# Patient Record
Sex: MALE | Race: BLACK OR AFRICAN AMERICAN | NOT HISPANIC OR LATINO | Employment: OTHER | URBAN - METROPOLITAN AREA
[De-identification: names, ages, dates, MRNs, and addresses within clinical notes are randomized per-mention and may not be internally consistent; named-entity substitution may affect disease eponyms.]

---

## 2020-04-22 ENCOUNTER — APPOINTMENT (OUTPATIENT)
Dept: CT IMAGING | Facility: HOSPITAL | Age: 65
End: 2020-04-22

## 2020-04-22 ENCOUNTER — APPOINTMENT (OUTPATIENT)
Dept: GENERAL RADIOLOGY | Facility: HOSPITAL | Age: 65
End: 2020-04-22

## 2020-04-22 ENCOUNTER — HOSPITAL ENCOUNTER (INPATIENT)
Facility: HOSPITAL | Age: 65
LOS: 3 days | Discharge: HOME | End: 2020-04-25
Attending: EMERGENCY MEDICINE | Admitting: INTERNAL MEDICINE

## 2020-04-22 ENCOUNTER — APPOINTMENT (OUTPATIENT)
Dept: ULTRASOUND IMAGING | Facility: HOSPITAL | Age: 65
End: 2020-04-22

## 2020-04-22 ENCOUNTER — APPOINTMENT (OUTPATIENT)
Dept: NEUROLOGY | Facility: HOSPITAL | Age: 65
End: 2020-04-22

## 2020-04-22 ENCOUNTER — APPOINTMENT (OUTPATIENT)
Dept: MRI IMAGING | Facility: HOSPITAL | Age: 65
End: 2020-04-22

## 2020-04-22 DIAGNOSIS — R56.9 NEW ONSET SEIZURE (HCC): Primary | ICD-10-CM

## 2020-04-22 DIAGNOSIS — D72.829 LEUKOCYTOSIS, UNSPECIFIED TYPE: ICD-10-CM

## 2020-04-22 DIAGNOSIS — N39.0 ACUTE UTI: ICD-10-CM

## 2020-04-22 DIAGNOSIS — N18.9 ACUTE KIDNEY INJURY SUPERIMPOSED ON CHRONIC KIDNEY DISEASE (HCC): ICD-10-CM

## 2020-04-22 DIAGNOSIS — I10 UNCONTROLLED HYPERTENSION: ICD-10-CM

## 2020-04-22 DIAGNOSIS — N17.9 ACUTE KIDNEY INJURY SUPERIMPOSED ON CHRONIC KIDNEY DISEASE (HCC): ICD-10-CM

## 2020-04-22 PROBLEM — N28.9 RENAL INSUFFICIENCY: Status: ACTIVE | Noted: 2020-04-22

## 2020-04-22 PROBLEM — N28.89 RENAL MASS: Status: ACTIVE | Noted: 2020-04-22

## 2020-04-22 PROBLEM — R73.9 HYPERGLYCEMIA: Status: ACTIVE | Noted: 2020-04-22

## 2020-04-22 PROBLEM — Z72.0 TOBACCO USE: Status: ACTIVE | Noted: 2020-04-22

## 2020-04-22 PROBLEM — R11.2 NAUSEA & VOMITING: Status: ACTIVE | Noted: 2020-04-22

## 2020-04-22 LAB
ALBUMIN SERPL-MCNC: 4.1 G/DL (ref 3.5–5.2)
ALBUMIN/GLOB SERPL: 1 G/DL
ALP SERPL-CCNC: 71 U/L (ref 39–117)
ALT SERPL W P-5'-P-CCNC: 19 U/L (ref 1–41)
AMPHET+METHAMPHET UR QL: NEGATIVE
ANION GAP SERPL CALCULATED.3IONS-SCNC: 23.1 MMOL/L (ref 5–15)
AST SERPL-CCNC: 31 U/L (ref 1–40)
BACTERIA UR QL AUTO: ABNORMAL /HPF
BARBITURATES UR QL SCN: NEGATIVE
BASOPHILS # BLD AUTO: 0.03 10*3/MM3 (ref 0–0.2)
BASOPHILS NFR BLD AUTO: 0.2 % (ref 0–1.5)
BENZODIAZ UR QL SCN: NEGATIVE
BILIRUB SERPL-MCNC: 1.1 MG/DL (ref 0.2–1.2)
BILIRUB UR QL STRIP: NEGATIVE
BUN BLD-MCNC: 22 MG/DL (ref 8–23)
BUN/CREAT SERPL: 11 (ref 7–25)
CALCIUM SPEC-SCNC: 9.3 MG/DL (ref 8.6–10.5)
CANNABINOIDS SERPL QL: POSITIVE
CHLORIDE SERPL-SCNC: 93 MMOL/L (ref 98–107)
CLARITY UR: ABNORMAL
CO2 SERPL-SCNC: 19.9 MMOL/L (ref 22–29)
COCAINE UR QL: NEGATIVE
COLOR UR: YELLOW
CREAT BLD-MCNC: 2 MG/DL (ref 0.76–1.27)
D-LACTATE SERPL-SCNC: 1.6 MMOL/L (ref 0.5–2)
DEPRECATED RDW RBC AUTO: 47.7 FL (ref 37–54)
EOSINOPHIL # BLD AUTO: 0.01 10*3/MM3 (ref 0–0.4)
EOSINOPHIL NFR BLD AUTO: 0.1 % (ref 0.3–6.2)
ERYTHROCYTE [DISTWIDTH] IN BLOOD BY AUTOMATED COUNT: 15.1 % (ref 12.3–15.4)
ETHANOL BLD-MCNC: <10 MG/DL (ref 0–10)
ETHANOL UR QL: <0.01 %
GFR SERPL CREATININE-BSD FRML MDRD: 41 ML/MIN/1.73
GLOBULIN UR ELPH-MCNC: 4 GM/DL
GLUCOSE BLD-MCNC: 186 MG/DL (ref 65–99)
GLUCOSE BLDC GLUCOMTR-MCNC: 122 MG/DL (ref 70–130)
GLUCOSE BLDC GLUCOMTR-MCNC: 180 MG/DL (ref 70–130)
GLUCOSE UR STRIP-MCNC: ABNORMAL MG/DL
GRAN CASTS URNS QL MICRO: ABNORMAL /LPF
HBA1C MFR BLD: 5.8 % (ref 4.8–5.6)
HCT VFR BLD AUTO: 48 % (ref 37.5–51)
HGB BLD-MCNC: 16.5 G/DL (ref 13–17.7)
HGB UR QL STRIP.AUTO: ABNORMAL
HYALINE CASTS UR QL AUTO: ABNORMAL /LPF
IMM GRANULOCYTES # BLD AUTO: 0.17 10*3/MM3 (ref 0–0.05)
IMM GRANULOCYTES NFR BLD AUTO: 0.9 % (ref 0–0.5)
KETONES UR QL STRIP: ABNORMAL
LEUKOCYTE ESTERASE UR QL STRIP.AUTO: NEGATIVE
LYMPHOCYTES # BLD AUTO: 1.1 10*3/MM3 (ref 0.7–3.1)
LYMPHOCYTES NFR BLD AUTO: 6 % (ref 19.6–45.3)
MAGNESIUM SERPL-MCNC: 3 MG/DL (ref 1.6–2.4)
MCH RBC QN AUTO: 29.7 PG (ref 26.6–33)
MCHC RBC AUTO-ENTMCNC: 34.4 G/DL (ref 31.5–35.7)
MCV RBC AUTO: 86.5 FL (ref 79–97)
METHADONE UR QL SCN: NEGATIVE
MONOCYTES # BLD AUTO: 1.53 10*3/MM3 (ref 0.1–0.9)
MONOCYTES NFR BLD AUTO: 8.3 % (ref 5–12)
NEUTROPHILS # BLD AUTO: 15.54 10*3/MM3 (ref 1.7–7)
NEUTROPHILS NFR BLD AUTO: 84.5 % (ref 42.7–76)
NITRITE UR QL STRIP: NEGATIVE
NRBC BLD AUTO-RTO: 0 /100 WBC (ref 0–0.2)
OPIATES UR QL: NEGATIVE
OXYCODONE UR QL SCN: NEGATIVE
PH UR STRIP.AUTO: 5.5 [PH] (ref 5–8)
PLATELET # BLD AUTO: 176 10*3/MM3 (ref 140–450)
PMV BLD AUTO: 11.9 FL (ref 6–12)
POTASSIUM BLD-SCNC: 3.9 MMOL/L (ref 3.5–5.2)
PROCALCITONIN SERPL-MCNC: 0.31 NG/ML (ref 0.1–0.25)
PROT SERPL-MCNC: 8.1 G/DL (ref 6–8.5)
PROT UR QL STRIP: ABNORMAL
RBC # BLD AUTO: 5.55 10*6/MM3 (ref 4.14–5.8)
RBC # UR: ABNORMAL /HPF
REF LAB TEST METHOD: ABNORMAL
SODIUM BLD-SCNC: 136 MMOL/L (ref 136–145)
SP GR UR STRIP: >=1.03 (ref 1–1.03)
SPERM URNS QL MICRO: ABNORMAL /HPF
SQUAMOUS #/AREA URNS HPF: ABNORMAL /HPF
TROPONIN T SERPL-MCNC: 0.03 NG/ML (ref 0–0.03)
UROBILINOGEN UR QL STRIP: ABNORMAL
WBC NRBC COR # BLD: 18.38 10*3/MM3 (ref 3.4–10.8)
WBC UR QL AUTO: ABNORMAL /HPF

## 2020-04-22 PROCEDURE — 80307 DRUG TEST PRSMV CHEM ANLYZR: CPT | Performed by: PHYSICIAN ASSISTANT

## 2020-04-22 PROCEDURE — 87040 BLOOD CULTURE FOR BACTERIA: CPT | Performed by: PHYSICIAN ASSISTANT

## 2020-04-22 PROCEDURE — 99254 IP/OBS CNSLTJ NEW/EST MOD 60: CPT | Performed by: PSYCHIATRY & NEUROLOGY

## 2020-04-22 PROCEDURE — 95816 EEG AWAKE AND DROWSY: CPT

## 2020-04-22 PROCEDURE — 87147 CULTURE TYPE IMMUNOLOGIC: CPT | Performed by: PHYSICIAN ASSISTANT

## 2020-04-22 PROCEDURE — 81001 URINALYSIS AUTO W/SCOPE: CPT | Performed by: PHYSICIAN ASSISTANT

## 2020-04-22 PROCEDURE — 82962 GLUCOSE BLOOD TEST: CPT

## 2020-04-22 PROCEDURE — 95819 EEG AWAKE AND ASLEEP: CPT | Performed by: PSYCHIATRY & NEUROLOGY

## 2020-04-22 PROCEDURE — 99285 EMERGENCY DEPT VISIT HI MDM: CPT

## 2020-04-22 PROCEDURE — 85025 COMPLETE CBC W/AUTO DIFF WBC: CPT | Performed by: PHYSICIAN ASSISTANT

## 2020-04-22 PROCEDURE — 83036 HEMOGLOBIN GLYCOSYLATED A1C: CPT | Performed by: NURSE PRACTITIONER

## 2020-04-22 PROCEDURE — 87150 DNA/RNA AMPLIFIED PROBE: CPT | Performed by: PHYSICIAN ASSISTANT

## 2020-04-22 PROCEDURE — 93010 ELECTROCARDIOGRAM REPORT: CPT | Performed by: INTERNAL MEDICINE

## 2020-04-22 PROCEDURE — 25010000002 HYDRALAZINE PER 20 MG: Performed by: NURSE PRACTITIONER

## 2020-04-22 PROCEDURE — 93005 ELECTROCARDIOGRAM TRACING: CPT | Performed by: NURSE PRACTITIONER

## 2020-04-22 PROCEDURE — 83605 ASSAY OF LACTIC ACID: CPT | Performed by: PHYSICIAN ASSISTANT

## 2020-04-22 PROCEDURE — 71045 X-RAY EXAM CHEST 1 VIEW: CPT

## 2020-04-22 PROCEDURE — 83735 ASSAY OF MAGNESIUM: CPT | Performed by: PHYSICIAN ASSISTANT

## 2020-04-22 PROCEDURE — 25010000002 ONDANSETRON PER 1 MG: Performed by: NURSE PRACTITIONER

## 2020-04-22 PROCEDURE — 84145 PROCALCITONIN (PCT): CPT | Performed by: NURSE PRACTITIONER

## 2020-04-22 PROCEDURE — 25010000002 CEFTRIAXONE PER 250 MG: Performed by: PHYSICIAN ASSISTANT

## 2020-04-22 PROCEDURE — 74018 RADEX ABDOMEN 1 VIEW: CPT

## 2020-04-22 PROCEDURE — 76775 US EXAM ABDO BACK WALL LIM: CPT

## 2020-04-22 PROCEDURE — 80053 COMPREHEN METABOLIC PANEL: CPT | Performed by: PHYSICIAN ASSISTANT

## 2020-04-22 PROCEDURE — 70551 MRI BRAIN STEM W/O DYE: CPT

## 2020-04-22 PROCEDURE — 70450 CT HEAD/BRAIN W/O DYE: CPT

## 2020-04-22 PROCEDURE — 84484 ASSAY OF TROPONIN QUANT: CPT | Performed by: NURSE PRACTITIONER

## 2020-04-22 RX ORDER — SPIRONOLACTONE 25 MG/1
25 TABLET ORAL DAILY
COMMUNITY
Start: 2020-01-22 | End: 2020-04-25 | Stop reason: HOSPADM

## 2020-04-22 RX ORDER — DOCUSATE SODIUM 100 MG/1
100 CAPSULE, LIQUID FILLED ORAL 2 TIMES DAILY PRN
Status: DISCONTINUED | OUTPATIENT
Start: 2020-04-22 | End: 2020-04-25 | Stop reason: HOSPADM

## 2020-04-22 RX ORDER — OLMESARTAN MEDOXOMIL, AMLODIPINE AND HYDROCHLOROTHIAZIDE TABLET 40/10/25 MG 40; 10; 25 MG/1; MG/1; MG/1
1 TABLET ORAL DAILY
COMMUNITY
Start: 2020-01-22 | End: 2020-04-25 | Stop reason: HOSPADM

## 2020-04-22 RX ORDER — OMEPRAZOLE 20 MG/1
20 CAPSULE, DELAYED RELEASE ORAL DAILY
COMMUNITY
Start: 2020-04-20

## 2020-04-22 RX ORDER — ONDANSETRON 2 MG/ML
4 INJECTION INTRAMUSCULAR; INTRAVENOUS EVERY 6 HOURS PRN
Status: DISCONTINUED | OUTPATIENT
Start: 2020-04-22 | End: 2020-04-25 | Stop reason: HOSPADM

## 2020-04-22 RX ORDER — LABETALOL HYDROCHLORIDE 5 MG/ML
20 INJECTION, SOLUTION INTRAVENOUS ONCE
Status: COMPLETED | OUTPATIENT
Start: 2020-04-22 | End: 2020-04-22

## 2020-04-22 RX ORDER — AMLODIPINE BESYLATE 5 MG/1
10 TABLET ORAL ONCE
Status: COMPLETED | OUTPATIENT
Start: 2020-04-22 | End: 2020-04-22

## 2020-04-22 RX ORDER — NICOTINE POLACRILEX 4 MG
15 LOZENGE BUCCAL
Status: DISCONTINUED | OUTPATIENT
Start: 2020-04-22 | End: 2020-04-25 | Stop reason: HOSPADM

## 2020-04-22 RX ORDER — AMLODIPINE BESYLATE 10 MG/1
10 TABLET ORAL
Status: DISCONTINUED | OUTPATIENT
Start: 2020-04-23 | End: 2020-04-25 | Stop reason: HOSPADM

## 2020-04-22 RX ORDER — PANTOPRAZOLE SODIUM 40 MG/1
40 TABLET, DELAYED RELEASE ORAL EVERY MORNING
Status: DISCONTINUED | OUTPATIENT
Start: 2020-04-23 | End: 2020-04-25 | Stop reason: HOSPADM

## 2020-04-22 RX ORDER — ACETAMINOPHEN 325 MG/1
650 TABLET ORAL EVERY 4 HOURS PRN
Status: DISCONTINUED | OUTPATIENT
Start: 2020-04-22 | End: 2020-04-25 | Stop reason: HOSPADM

## 2020-04-22 RX ORDER — DICYCLOMINE HCL 20 MG
20 TABLET ORAL
COMMUNITY
Start: 2020-04-20

## 2020-04-22 RX ORDER — METOPROLOL SUCCINATE 100 MG/1
100 TABLET, EXTENDED RELEASE ORAL
Status: DISCONTINUED | OUTPATIENT
Start: 2020-04-22 | End: 2020-04-22

## 2020-04-22 RX ORDER — DEXTROSE MONOHYDRATE 25 G/50ML
25 INJECTION, SOLUTION INTRAVENOUS
Status: DISCONTINUED | OUTPATIENT
Start: 2020-04-22 | End: 2020-04-25 | Stop reason: HOSPADM

## 2020-04-22 RX ORDER — CLONIDINE HYDROCHLORIDE 0.1 MG/1
0.1 TABLET ORAL EVERY 4 HOURS PRN
Status: DISCONTINUED | OUTPATIENT
Start: 2020-04-22 | End: 2020-04-25 | Stop reason: HOSPADM

## 2020-04-22 RX ORDER — ONDANSETRON 4 MG/1
4 TABLET, ORALLY DISINTEGRATING ORAL
COMMUNITY
Start: 2020-04-20

## 2020-04-22 RX ORDER — PROMETHAZINE HYDROCHLORIDE 25 MG/1
25 SUPPOSITORY RECTAL
COMMUNITY
Start: 2020-04-20 | End: 2020-04-25 | Stop reason: HOSPADM

## 2020-04-22 RX ORDER — METOPROLOL SUCCINATE 100 MG/1
100 TABLET, EXTENDED RELEASE ORAL
Status: DISCONTINUED | OUTPATIENT
Start: 2020-04-23 | End: 2020-04-23

## 2020-04-22 RX ORDER — SODIUM CHLORIDE 0.9 % (FLUSH) 0.9 %
10 SYRINGE (ML) INJECTION EVERY 12 HOURS SCHEDULED
Status: DISCONTINUED | OUTPATIENT
Start: 2020-04-22 | End: 2020-04-25 | Stop reason: HOSPADM

## 2020-04-22 RX ORDER — SPIRONOLACTONE 25 MG/1
25 TABLET ORAL DAILY
Status: DISCONTINUED | OUTPATIENT
Start: 2020-04-22 | End: 2020-04-22

## 2020-04-22 RX ORDER — SODIUM CHLORIDE 0.9 % (FLUSH) 0.9 %
10 SYRINGE (ML) INJECTION AS NEEDED
Status: DISCONTINUED | OUTPATIENT
Start: 2020-04-22 | End: 2020-04-25 | Stop reason: HOSPADM

## 2020-04-22 RX ORDER — NICOTINE 21 MG/24HR
1 PATCH, TRANSDERMAL 24 HOURS TRANSDERMAL
Status: DISCONTINUED | OUTPATIENT
Start: 2020-04-22 | End: 2020-04-25 | Stop reason: HOSPADM

## 2020-04-22 RX ORDER — CEFTRIAXONE SODIUM 1 G/50ML
1 INJECTION, SOLUTION INTRAVENOUS EVERY 24 HOURS
Status: COMPLETED | OUTPATIENT
Start: 2020-04-23 | End: 2020-04-24

## 2020-04-22 RX ORDER — HYDRALAZINE HYDROCHLORIDE 20 MG/ML
10 INJECTION INTRAMUSCULAR; INTRAVENOUS EVERY 6 HOURS PRN
Status: DISCONTINUED | OUTPATIENT
Start: 2020-04-22 | End: 2020-04-25 | Stop reason: HOSPADM

## 2020-04-22 RX ORDER — CEFTRIAXONE SODIUM 1 G/50ML
1 INJECTION, SOLUTION INTRAVENOUS ONCE
Status: COMPLETED | OUTPATIENT
Start: 2020-04-22 | End: 2020-04-22

## 2020-04-22 RX ORDER — METOPROLOL SUCCINATE 100 MG/1
100 TABLET, EXTENDED RELEASE ORAL ONCE
Status: COMPLETED | OUTPATIENT
Start: 2020-04-22 | End: 2020-04-22

## 2020-04-22 RX ADMIN — HYDRALAZINE HYDROCHLORIDE 10 MG: 20 INJECTION INTRAMUSCULAR; INTRAVENOUS at 17:22

## 2020-04-22 RX ADMIN — AMLODIPINE BESYLATE 10 MG: 5 TABLET ORAL at 08:42

## 2020-04-22 RX ADMIN — CEFTRIAXONE SODIUM 1 G: 1 INJECTION, SOLUTION INTRAVENOUS at 09:43

## 2020-04-22 RX ADMIN — SODIUM CHLORIDE, PRESERVATIVE FREE 10 ML: 5 INJECTION INTRAVENOUS at 20:56

## 2020-04-22 RX ADMIN — METOPROLOL SUCCINATE 100 MG: 100 TABLET, EXTENDED RELEASE ORAL at 08:54

## 2020-04-22 RX ADMIN — LABETALOL 20 MG/4 ML (5 MG/ML) INTRAVENOUS SYRINGE 20 MG: at 07:39

## 2020-04-22 RX ADMIN — NICOTINE 1 PATCH: 21 PATCH, EXTENDED RELEASE TRANSDERMAL at 18:31

## 2020-04-22 RX ADMIN — LABETALOL 20 MG/4 ML (5 MG/ML) INTRAVENOUS SYRINGE 20 MG: at 08:41

## 2020-04-22 RX ADMIN — ONDANSETRON 4 MG: 2 INJECTION INTRAMUSCULAR; INTRAVENOUS at 21:46

## 2020-04-22 NOTE — ED NOTES
Pt to ED from home with c/o new onset sz witnessed by spouse this morning. Pt also c/o lower abdominal pain that has been going on for several days. Pt states he was seen for belly pain at [place] recently and discharged home. Pt states nausea and vomiting with belly pain x 3 days. Pt hypertensive per EMS. Pt alert at triage, slightly lethargic but able to state name and SS#. Mask placed on pt at triage.     Emy Evans RN  04/22/20 0627

## 2020-04-22 NOTE — ED NOTES
Pt presents to ED via EMS w/c seizure/abdominal pain.  PT states he has no recollection of events of the evening, wife stated to EMS seizure like activity.  Pt complains of hiccups x3 days  Pt states generalized lower abdominal discomfort, nausea.  Pt denies nay recent illness, fever, cough or chills.  Pt A&Ox4     Nicki Salas, RN  04/22/20 0653

## 2020-04-22 NOTE — ED PROVIDER NOTES
07:21  Patient seen and examined with physician assistant.  Briefly patient is 64-year-old male who was seen at another hospital for abdominal pain.  His CT scan was negative and white blood cell count was normal.  States his belly pain has been getting better.  Then last night/this morning patient has tonic-clonic episode with post ictal.  He has no recollection of this.  Denies tongue biting or loss of bowel or bladder continence.    On exam patient is alert and oriented.  No tongue injury.  Patient's abdomen is soft and nontender.  Moves all extremities with normal strength and normal sensation.      Plan is evaluation for seizure and probable admission      MD ATTESTATION NOTE    The CHRISTINA and I have discussed this patient's history, physical exam, and treatment plan.  I have reviewed the documentation and personally had a face to face interaction with the patient. I affirm the documentation and agree with the treatment and plan.  The attached note describes my personal findings.       Isidro Gloria MD  04/22/20 8841

## 2020-04-22 NOTE — ED PROVIDER NOTES
EMERGENCY DEPARTMENT ENCOUNTER    Room Number:  06/06  Date seen:  4/22/2020  Time seen: 6:58 AM  PCP: No primary care provider on file.  Historian: patient      HPI:  Chief Complaint: seizure    A complete HPI/ROS/PMH/PSH/SH/FH are unobtainable due to: none    Context: Evin Park is a 64 y.o. male who presents to the ED for evaluation of seizure like activity.  He was brought in via EMS.  He denies any history of seizures.  He states he drinks alcohol occasionally but not daily and has not had any in 4 days.  He denies any drug use.  He states he has been out of his blood pressure medications for couple days and is waiting for refills from his PCP. He denies chest pain, abdominal pain, fever, URI symptoms and any known exposure to someone positive for COVID 19.    I called his wife, Gilma.  She states they were asleep and she was awakened by him thrashing around in the bed. She checked on him and he continued the thrashing for 2-3 minutes and then was drooling and then wouldn't respond to her.  He was not incontinent of urine or stool.  She states she does drink alcohol occasionally but not daily or regularly.  He was at the [facility] yesterday for abdominal pain.  States he had abdominal pain most of the day, had nausea and vomiting and took mag citrate and MiraLAX because he felt like he was constipated.    PAST MEDICAL HISTORY  Active Ambulatory Problems     Diagnosis Date Noted   • No Active Ambulatory Problems     Resolved Ambulatory Problems     Diagnosis Date Noted   • No Resolved Ambulatory Problems     Past Medical History:   Diagnosis Date   • Hypertension          PAST SURGICAL HISTORY  History reviewed. No pertinent surgical history.      FAMILY HISTORY  History reviewed. No pertinent family history.      SOCIAL HISTORY  Social History     Tobacco Use   • Smoking status: Current Every Day Smoker   Substance and Sexual Activity   • Alcohol use: Yes         ALLERGIES  Patient has no  known allergies.        REVIEW OF SYSTEMS  Review of Systems   Constitutional: Negative for chills and fever.   HENT: Negative.  Negative for congestion and sore throat.    Eyes: Negative.    Respiratory: Negative for cough and shortness of breath.    Cardiovascular: Negative for chest pain.   Gastrointestinal: Positive for abdominal pain, nausea and vomiting.   Genitourinary: Negative.  Negative for dysuria and hematuria.   Musculoskeletal: Negative.    Skin: Negative.    Neurological: Positive for seizures.   Psychiatric/Behavioral: Negative.             PHYSICAL EXAM  ED Triage Vitals [04/22/20 0627]   Temp Heart Rate Resp BP SpO2   97.7 °F (36.5 °C) 110 16 (!) 190/125 96 %      Temp src Heart Rate Source Patient Position BP Location FiO2 (%)   Tympanic Monitor -- -- --         GENERAL: not distressed  HENT: atraumatic, normocephalic  EYES: no scleral icterus, PERRL, EOMI  CV: regular rhythm, regular rate  RESPIRATORY: normal effort, ctab  ABDOMEN: soft, nontender, nondistended, normal bowel sounds  MUSCULOSKELETAL: no deformity  NEURO: alert, moves all extremities, follows commands, normal strength and sensation, mildly drowsy, oriented x 3  SKIN: warm, dry    Vital signs and nursing notes reviewed.          LAB RESULTS  Recent Results (from the past 24 hour(s))   CBC Auto Differential    Collection Time: 04/22/20  6:48 AM   Result Value Ref Range    WBC 18.38 (H) 3.40 - 10.80 10*3/mm3    RBC 5.55 4.14 - 5.80 10*6/mm3    Hemoglobin 16.5 13.0 - 17.7 g/dL    Hematocrit 48.0 37.5 - 51.0 %    MCV 86.5 79.0 - 97.0 fL    MCH 29.7 26.6 - 33.0 pg    MCHC 34.4 31.5 - 35.7 g/dL    RDW 15.1 12.3 - 15.4 %    RDW-SD 47.7 37.0 - 54.0 fl    MPV 11.9 6.0 - 12.0 fL    Platelets 176 140 - 450 10*3/mm3    Neutrophil % 84.5 (H) 42.7 - 76.0 %    Lymphocyte % 6.0 (L) 19.6 - 45.3 %    Monocyte % 8.3 5.0 - 12.0 %    Eosinophil % 0.1 (L) 0.3 - 6.2 %    Basophil % 0.2 0.0 - 1.5 %    Immature Grans % 0.9 (H) 0.0 - 0.5 %    Neutrophils,  Absolute 15.54 (H) 1.70 - 7.00 10*3/mm3    Lymphocytes, Absolute 1.10 0.70 - 3.10 10*3/mm3    Monocytes, Absolute 1.53 (H) 0.10 - 0.90 10*3/mm3    Eosinophils, Absolute 0.01 0.00 - 0.40 10*3/mm3    Basophils, Absolute 0.03 0.00 - 0.20 10*3/mm3    Immature Grans, Absolute 0.17 (H) 0.00 - 0.05 10*3/mm3    nRBC 0.0 0.0 - 0.2 /100 WBC   POC Glucose Once    Collection Time: 04/22/20  6:50 AM   Result Value Ref Range    Glucose 180 (H) 70 - 130 mg/dL       Ordered the above labs and reviewed the results.        RADIOLOGY  Ct Head Without Contrast    Result Date: 4/22/2020  Narrative: CT HEAD WITHOUT CONTRAST  HISTORY: New onset seizure, hypertension.  COMPARISON: None.  FINDINGS: The study is hampered by patient motion. There is no evidence of intracranial hemorrhage, hydrocephalus or of abnormal extra-axial fluid. Areas of decreased attenuation involving the white matter of the cerebral hemispheres are noted bilaterally suggesting moderate small vessel ischemic disease. A lacunar infarct involving the head of the caudate nucleus on the left is noted. No focal area of decreased attenuation to suggest acute infarction is identified.      Impression: 1. The study is hampered by patient motion. 2. There is no evidence of acute infarction or hemorrhage. 3. Moderate small vessel ischemic disease and a lacunar infarct involving the head of the caudate nucleus on the left is noted. Further evaluation could be performed with a MRI examination of the brain with and without contrast, particularly in a patient with a history of new onset seizure activity.  The above information was called to and discussed with Pastora Joy.    Radiation dose reduction techniques were utilized, including automated exposure control and exposure modulation based on body size.  This report was finalized on 4/22/2020 7:52 AM by Dr. Ernie Zavala M.D.      Xr Chest 1 View    Result Date: 4/22/2020  Narrative: XR CHEST 1 VW-  04/22/2020  HISTORY: New  onset seizures.  The heart size is within normal limits. The chest is rotated minimally to the left. No focal infiltrates are seen. No pneumothorax is seen. Bony structures appear unremarkable.      Impression: No acute process identified.  This report was finalized on 4/22/2020 7:12 AM by Dr. Ezequiel Vasquez M.D.            PROCEDURES  Procedures            MEDICATIONS GIVEN IN ER  Medications   cloNIDine (CATAPRES) tablet 0.1 mg (has no administration in time range)   labetalol (NORMODYNE,TRANDATE) injection 20 mg (20 mg Intravenous Given 4/22/20 0739)   labetalol (NORMODYNE,TRANDATE) injection 20 mg (20 mg Intravenous Given 4/22/20 0841)   metoprolol succinate XL (TOPROL-XL) 24 hr tablet 100 mg (100 mg Oral Given 4/22/20 0854)   amLODIPine (NORVASC) tablet 10 mg (10 mg Oral Given 4/22/20 0842)   cefTRIAXone (ROCEPHIN) IVPB 1 g (1 g Intravenous New Bag 4/22/20 0943)             PROGRESS AND CONSULTS      ED Course as of Apr 22 1258 Wed Apr 22, 2020   0703 CHART REVIEW    ER visit at [facility] on 4/20/2020, yesterday, for sudden onset of epigastric abdominal pain with nausea vomiting and acute kidney injury.  CT of the abdomen and pelvis showed no acute abnormalities and interval diminution in size of a previously evaluated left renal mass.  EKG was noted to be normal sinus rhythm, white blood cell count was 8.8.    [KA]   0704  Reviewed pt's history and workup with Dr. Gloria.  After a bedside evaluation; Dr Gloria agrees with the plan of care      [KA]   0731 CT head shows no acute findings per Dr. Zavala.    [KA]   0830 I rechecked the patient, he is resting comfortably.  Blood pressure remains elevated despite initial dose of labetalol, will repeat and give home meds including Norvasc 10 mg and Toprol- mg.  He continues to deny any abdominal pain currently.  His abdomen remains benign.  A blood cell count is increased to 18,000 from 8000 yesterday and with new onset seizure and  acute kidney injury, admission is indicated.  I discussed this with him and he is agreeable.  Pending urinalysis will consult for admission.    [KA]   7921 I discussed the patient with Dr. Trejo of [facility] who agrees to admit.    [KA]   1007 I discussed the patient with Dr. Baum of neurology who agrees to consult    [KA]      ED Course User Index  [KA] Pastora Joy PA       Patient was placed in face mask in first look. Patient was wearing facemask each time I entered the room and throughout our encounter. I wore protective equipment throughout this patient encounter including a face mask, eye shield and gloves. Hand hygiene was performed before donning protective equipment and after removal when leaving the room.      MEDICAL DECISION MAKING      MDM       By history it does seem that the patient had a seizure with a postictal state.  Additionally his blood pressure is uncontrolled, it appears he has an acute UTI, and acute kidney injury.  Though he has had abdominal pain, he has no abdominal pain here, abdomen remains benign.  White blood cell count is elevated to 18,000, 2 days ago it was only 8 may be related to seizure and UTI.  At this time he requires hospitalization for further evaluation and treatment of his symptoms.  He is agreeable with this plan.    DIAGNOSIS  Final diagnoses:   New onset seizure (CMS/HCC)   Uncontrolled hypertension   Acute UTI   Leukocytosis, unspecified type   Acute kidney injury superimposed on chronic kidney disease (CMS/HCC)         DISPOSITION  Admit        Latest Documented Vital Signs:  As of 06:58  BP- (!) 190/125 HR- 110 Temp- 97.7 °F (36.5 °C) (Tympanic) O2 sat- 96%       Pastora Joy PA  04/22/20 1310

## 2020-04-22 NOTE — CONSULTS
Patient Identification:  NAME:  Evin Park  Age:  64 y.o.   Sex:  male   :  1955   MRN:  [id number]       Chief complaint: He does not have one, reason for consult new onset seizure  History of present illness: This patient is a 64-year-old right-handed black male with past history of hypertension who comes in with new onset generalized seizure duration not known quality tonic-clonic associated symptoms he was postictal context a patient who states he is never had a stroke but by my independent I will review CT does show some chronic atrophy and evidence of lacunar disease.  Quality as described modifying factors we have not given him any seizure medications at this time.  Context patient who does have an acute urinary tract infection acute renal insufficiency and is being treated with antibiotics appropriately for this.  He is never had a seizure before did not bite his tongue and states he is never had a stroke he does not have any new focal paresthesias paralysis.  No headache or neck stiffness and he denies fever or rash he did have some abdominal pain recently      Past medical history:  Past Medical History:   Diagnosis Date   • Hypertension        Past surgical history:  History reviewed. No pertinent surgical history.    Allergies:  Patient has no known allergies.    Home medications:  Medications Prior to Admission   Medication Sig Dispense Refill Last Dose   • dicyclomine (BENTYL) 20 MG tablet Take 20 mg by mouth.   Past Week at Unknown time   • Metoprolol Succinate 100 MG capsule extended-release 24 hour sprinkle Take 1 tablet by mouth Daily.   Past Week at Unknown time   • metoprolol tartrate (LOPRESSOR) 25 MG tablet Take 25 mg by mouth 2 (Two) Times a Day.   Past Week at Unknown time   • Olmesartan-amLODIPine-HCTZ 40-10-25 MG tablet Take 1 tablet by mouth Daily.   Past Week at Unknown time   • omeprazole (priLOSEC) 20 MG capsule Take 20 mg by mouth Daily.   Past Week at Unknown time   •  ondansetron ODT (ZOFRAN-ODT) 4 MG disintegrating tablet Take 4 mg by mouth.   4/21/2020 at Unknown time   • promethazine (PHENERGAN) 25 MG suppository Insert 25 mg into the rectum.   Past Week at Unknown time   • spironolactone (ALDACTONE) 25 MG tablet Take 25 mg by mouth Daily.   Past Week at Unknown time        Hospital medications:        •  cloNIDine    Family history:  History reviewed. No pertinent family history.    Social history:  Social History     Tobacco Use   • Smoking status: Current Every Day Smoker   Substance Use Topics   • Alcohol use: Yes   • Drug use: Not on file       Review of systems:    He states he is not a significant drinker does not drink on a daily basis he does smoke marijuana no other abnormal drugs he has not had headaches hair eyes ears nose throat skin bone joint  lymphatic hematologic oncologic complaints he has had some abdominal pain recently and does appear to have an acute urinary tract infection but he does not think he has had any fever chills or rash he specifically denies any type of tongue biting urinary incontinence with this episode but cannot give me anything else.  He states he is never had a stroke    Objective:  Vitals Ranges:   Temp:  [97.7 °F (36.5 °C)-98.3 °F (36.8 °C)] 98.3 °F (36.8 °C)  Heart Rate:  [] 81  Resp:  [16-18] 18  BP: (174-190)/(110-134) 174/110      Physical Exam:  Awake alert and oriented x3 fund of knowledge poor attention span concentration good recent remote memory fair language function normal well-developed well-nourished in no distress pupils 2 constricting to one 1/2 normal disc retinas visual fields extraocular movements full without nystagmus nasolabial folds palate tongue symmetrical hearing facial sensation head turning shoulder shrug is normal.  Motor some psychomotor retardation but no rigidity atrophy or fasciculations overall strength 5 out of 5 reflexes trace throughout symmetrical toes downgoing bilaterally sensation normal  light touch face both arms and both legs coordination normal in the upper extremity Station and gait was not tested as he just got done having a seizure and I did not want him to pass out or fall heart is regular without murmur neck supple without bruits extremities no clubbing cyanosis edema visual acuity normal at 3 feet    Results review:   I reviewed the patient's new clinical results.    Data review:  Lab Results (last 24 hours)     Procedure Component Value Units Date/Time    Lactic Acid, Plasma [[id number]]  (Normal) Collected:  04/22/20 0918    Specimen:  Blood Updated:  04/22/20 1008     Lactate 1.6 mmol/L     Blood Culture - Blood, Arm, Left [[id number]] Collected:  04/22/20 0939    Specimen:  Blood from Arm, Left Updated:  04/22/20 0944    Blood Culture - Blood, Arm, Right [[id number]] Collected:  04/22/20 0918    Specimen:  Blood from Arm, Right Updated:  04/22/20 0943    Urinalysis With Microscopic If Indicated (No Culture) - Urine, Clean Catch [[id number]]  (Abnormal) Collected:  04/22/20 0821    Specimen:  Urine, Clean Catch Updated:  04/22/20 0854     Color, UA Yellow     Appearance, UA Cloudy     pH, UA 5.5     Specific Gravity, UA >=1.030     Glucose,  mg/dL (Trace)     Ketones, UA Trace     Bilirubin, UA Negative     Blood, UA Large (3+)     Protein, UA >=300 mg/dL (3+)     Leuk Esterase, UA Negative     Nitrite, UA Negative     Urobilinogen, UA 0.2 E.U./dL    Urinalysis, Microscopic Only - Urine, Clean Catch [[id number]]  (Abnormal) Collected:  04/22/20 0821    Specimen:  Urine, Clean Catch Updated:  04/22/20 0854     RBC, UA 6-12 /HPF      WBC, UA 3-5 /HPF      Bacteria, UA 2+ /HPF      Squamous Epithelial Cells, UA 0-2 /HPF      Hyaline Casts, UA 13-20 /LPF      Granular Casts, UA 7-12 /LPF      Sperm, UA Small/1+ /HPF      Methodology Manual Light Microscopy    Urine Drug Screen - Urine, Clean Catch [[id number]]  (Abnormal) Collected:  04/22/20 0821    Specimen:  Urine, Clean Catch Updated:   04/22/20 0848     Amphet/Methamphet, Screen Negative     Barbiturates Screen, Urine Negative     Benzodiazepine Screen, Urine Negative     Cocaine Screen, Urine Negative     Opiate Screen Negative     THC, Screen, Urine Positive     Methadone Screen, Urine Negative     Oxycodone Screen, Urine Negative    Narrative:       Negative Thresholds For Drugs Screened:     Amphetamines               500 ng/ml   Barbiturates               200 ng/ml   Benzodiazepines            100 ng/ml   Cocaine                    300 ng/ml   Methadone                  300 ng/ml   Opiates                    300 ng/ml   Oxycodone                  100 ng/ml   THC                        50 ng/ml    The Normal Value for all drugs tested is negative. This report includes final unconfirmed screening results to be used for medical treatment purposes only. Unconfirmed results must not be used for non-medical purposes such as employment or legal testing. Clinical consideration should be applied to any drug of abuse test, particulary when unconfirmed results are used.    Comprehensive Metabolic Panel [[id number]]  (Abnormal) Collected:  04/22/20 0648    Specimen:  Blood Updated:  04/22/20 0721     Glucose 186 mg/dL      BUN 22 mg/dL      Creatinine 2.00 mg/dL      Sodium 136 mmol/L      Potassium 3.9 mmol/L      Chloride 93 mmol/L      CO2 19.9 mmol/L      Calcium 9.3 mg/dL      Total Protein 8.1 g/dL      Albumin 4.10 g/dL      ALT (SGPT) 19 U/L      AST (SGOT) 31 U/L      Alkaline Phosphatase 71 U/L      Total Bilirubin 1.1 mg/dL      eGFR  African Amer 41 mL/min/1.73      Globulin 4.0 gm/dL      A/G Ratio 1.0 g/dL      BUN/Creatinine Ratio 11.0     Anion Gap 23.1 mmol/L     Narrative:       GFR Normal >60  Chronic Kidney Disease <60  Kidney Failure <15      Magnesium [[id number]]  (Abnormal) Collected:  04/22/20 0648    Specimen:  Blood Updated:  04/22/20 0721     Magnesium 3.0 mg/dL     Ethanol [[id number]] Collected:  04/22/20 0648    Specimen:   Blood Updated:  04/22/20 0721     Ethanol <10 mg/dL      Ethanol % <0.010 %     CBC & Differential [[id number]] Collected:  04/22/20 0648    Specimen:  Blood Updated:  04/22/20 0658    Narrative:       The following orders were created for panel order CBC & Differential.  Procedure                               Abnormality         Status                     ---------                               -----------         ------                     CBC Auto Differential[[id number]]        Abnormal            Final result                 Please view results for these tests on the individual orders.    CBC Auto Differential [[id number]]  (Abnormal) Collected:  04/22/20 0648    Specimen:  Blood Updated:  04/22/20 0658     WBC 18.38 10*3/mm3      RBC 5.55 10*6/mm3      Hemoglobin 16.5 g/dL      Hematocrit 48.0 %      MCV 86.5 fL      MCH 29.7 pg      MCHC 34.4 g/dL      RDW 15.1 %      RDW-SD 47.7 fl      MPV 11.9 fL      Platelets 176 10*3/mm3      Neutrophil % 84.5 %      Lymphocyte % 6.0 %      Monocyte % 8.3 %      Eosinophil % 0.1 %      Basophil % 0.2 %      Immature Grans % 0.9 %      Neutrophils, Absolute 15.54 10*3/mm3      Lymphocytes, Absolute 1.10 10*3/mm3      Monocytes, Absolute 1.53 10*3/mm3      Eosinophils, Absolute 0.01 10*3/mm3      Basophils, Absolute 0.03 10*3/mm3      Immature Grans, Absolute 0.17 10*3/mm3      nRBC 0.0 /100 WBC     POC Glucose Once [[id number]]  (Abnormal) Collected:  04/22/20 0650    Specimen:  Blood Updated:  04/22/20 0651     Glucose 180 mg/dL            Imaging:  Imaging Results (Last 24 Hours)     Procedure Component Value Units Date/Time    CT Head Without Contrast [[id number]] Collected:  04/22/20 0740     Updated:  04/22/20 0755    Narrative:       CT HEAD WITHOUT CONTRAST     HISTORY: New onset seizure, hypertension.     COMPARISON: None.     FINDINGS: The study is hampered by patient motion. There is no evidence  of intracranial hemorrhage, hydrocephalus or of abnormal  extra-axial  fluid. Areas of decreased attenuation involving the white matter of the  cerebral hemispheres are noted bilaterally suggesting moderate small  vessel ischemic disease. A lacunar infarct involving the head of the  caudate nucleus on the left is noted. No focal area of decreased  attenuation to suggest acute infarction is identified.       Impression:       1. The study is hampered by patient motion.   2. There is no evidence of acute infarction or hemorrhage.   3. Moderate small vessel ischemic disease and a lacunar infarct  involving the head of the caudate nucleus on the left is noted. Further  evaluation could be performed with a MRI examination of the brain with  and without contrast, particularly in a patient with a history of new  onset seizure activity.     The above information was called to and discussed with Pastora Joy.           Radiation dose reduction techniques were utilized, including automated  exposure control and exposure modulation based on body size.     This report was finalized on 4/22/2020 7:52 AM by Dr. Ernie Zavala M.D.       XR Chest 1 View [[id number]] Collected:  04/22/20 0710     Updated:  04/22/20 0716    Narrative:       XR CHEST 1 VW-  04/22/2020     HISTORY: New onset seizures.     The heart size is within normal limits. The chest is rotated minimally  to the left. No focal infiltrates are seen. No pneumothorax is seen.  Bony structures appear unremarkable.       Impression:       No acute process identified.     This report was finalized on 4/22/2020 7:12 AM by Dr. Ezequiel Vasquez M.D.                Assessment and Plan:       New onset seizure (CMS/HCC)    In a patient with severe elevation of blood pressure elevated white count and what appears to be an acute urinary tract infection no significant alcohol by report no drugs and I do not see evidence of an acute stroke although by CT independent eyeball review he has had some chronic changes I will check a  plain MRI of the brain as he has some elevated creatinine acute renal failure and will check an EEG at this point I am not ready to place this gentleman on seizure medication for the rest of his life based upon a single generalized convulsion.  Let see what the work-up shows thanks      Wayne Baum MD  04/22/20  12:41

## 2020-04-22 NOTE — H&P
History and Physical    Patient Name: Evin Park  Age/Sex: 64 y.o. male  : 1955  MRN: [id number]    Date of Admission: 2020  Date of Encounter Visit: 20  Encounter Provider: ABILIO Nino  Place of Service: [facility]  Patient Care Team:  System, Provider Not In as PCP - General    Subjective:     Chief Complaint:   Chief Complaint   Patient presents with   • Abdominal Pain   • Seizures       History of Present Illness  Evin Park is a 64 y.o. male with a history of hypertension and lumbar disease who presented with complaints of seizure like activity.  Patient was just seen in the ER at [facility] 2 days ago due to complaints of chest pain, abdominal pain and nausea/ vomiting.  He had a CT of abdomen at that time that showed a left renal mass that was felt to possibly be a benign entity and was discharged home.  At home he was still complaining of abdominal pain, nausea and vomiting and per wife report he took some mag citrate and MiraLAX because he felt like he was constipated and still has not had a BM for a few days.  Wife went to check on him this morning and was noted that he was thrashing in bed for about 2 to 3 minutes and was drooling and difficult to respond.  He had no incontinence of urine and stool and did not seem to bite his tongue.  Denies any chest pain, fever, chills, cough, headache, blurred vision, shortness of breath or recent exposure to sick contact or someone with COVID-19. Denies any prior stroke, CAD, DM, HLD or cancer.      He does drink alcohol occasionally, but not on a regular basis and none within the past 4 days.  He reported that he was out of his blood pressure medications for a couple of days and was awaiting refill from his PCP. He is a 1PPD smoker and also long term marijuana user.  In the ER he was hypertensive, but had no fever or hypoxia.  Labs showed leukocytosis, hyperglycemia, slightly elevated creatinine of 2.0 (baseline of 1.7  on 420), mag 3.0, normal lactate and abnormal UA with greater than 300 protein, RBC, WBC and 2+ bacteria with trace ketones and negative nitrate.  Urine drug screen was positive for THC and alcohol level was negative.  This x-ray showed no acute disease.    CT abdomen at [facility]: 4/20/20  Abdomen: Limited evaluation of the lung bases is unremarkable. The gallbladder is normal. Again seen are small cysts within the liver, unchanged. The spleen, pancreas, adrenal glands, and right kidney are normal. There is a rim calcified round   low-density focus within the inferior aspect of the left kidney. This is smaller than on the prior study. There is no biliary or ureteral dilation.     Pelvis: The urinary bladder is unremarkable. There is diverticulosis of the descending and sigmoid segments of the colon but no sign of an acute inflammatory process. There is no ascites or free intraperitoneal air.    IMPRESSION:   1. No acute abnormalities of the abdomen and pelvis.  2. Interval diminution in size of a previously evaluated left renal mass. This almost certainly indicates that this is a benign entity.    Review of Systems   Constitutional: Negative for chills, fatigue and fever.   HENT: Negative for congestion and trouble swallowing.    Eyes: Negative for visual disturbance.   Respiratory: Negative for cough, shortness of breath and wheezing.    Cardiovascular: Positive for chest pain. Negative for palpitations and leg swelling.   Gastrointestinal: Positive for abdominal pain, constipation, nausea and vomiting. Negative for diarrhea.   Genitourinary: Positive for decreased urine volume. Negative for dysuria.        Pelvic pain   Musculoskeletal: Negative for back pain.   Neurological: Negative for dizziness, syncope, weakness and headaches.   Psychiatric/Behavioral: Negative for confusion. The patient is not nervous/anxious.    All other systems reviewed and are negative.    Past Medical and Surgical History:  Past Medical  History:   Diagnosis Date   • Hypertension    • Lumbar stenosis        History reviewed. No pertinent surgical history.    Home Medications:   No current facility-administered medications on file prior to encounter.      Current Outpatient Medications on File Prior to Encounter   Medication Sig Dispense Refill   • dicyclomine (BENTYL) 20 MG tablet Take 20 mg by mouth.     • Metoprolol Succinate 100 MG capsule extended-release 24 hour sprinkle Take 1 tablet by mouth Daily.     • Olmesartan-amLODIPine-HCTZ 40-10-25 MG tablet Take 1 tablet by mouth Daily.     • omeprazole (priLOSEC) 20 MG capsule Take 20 mg by mouth Daily.     • ondansetron ODT (ZOFRAN-ODT) 4 MG disintegrating tablet Take 4 mg by mouth.     • promethazine (PHENERGAN) 25 MG suppository Insert 25 mg into the rectum.     • spironolactone (ALDACTONE) 25 MG tablet Take 25 mg by mouth Daily.     • [DISCONTINUED] metoprolol tartrate (LOPRESSOR) 25 MG tablet Take 25 mg by mouth 2 (Two) Times a Day.         Inpatient Medications:  Scheduled Meds:    [START ON 4/23/2020] amLODIPine 10 mg Oral Q24H   [START ON 4/23/2020] cefTRIAXone 1 g Intravenous Q24H   insulin lispro 0-7 Units Subcutaneous TID AC   [START ON 4/23/2020] metoprolol succinate  mg Oral Q24H   [START ON 4/23/2020] pantoprazole 40 mg Oral QAM   sodium chloride 10 mL Intravenous Q12H   spironolactone 25 mg Oral Daily     Continuous Infusions:   PRN Meds:.•  acetaminophen  •  cloNIDine  •  dextrose  •  dextrose  •  glucagon (human recombinant)  •  hydrALAZINE  •  ondansetron  •  sodium chloride    Allergies:  No Known Allergies    Past Social History:  Social History     Socioeconomic History   • Marital status:      Spouse name: Not on file   • Number of children: Not on file   • Years of education: Not on file   • Highest education level: Not on file   Tobacco Use   • Smoking status: Current Every Day Smoker     Packs/day: 1.00   Substance and Sexual Activity   • Alcohol use: Yes      Comment: OCCASIONALLY 1-2 MIXED DRINKS   • Drug use: Yes     Types: Marijuana       Past Family History:  Family History   Problem Relation Age of Onset   • Hypertension Mother    • Diabetes Brother        Objective:   Temp:  [97.7 °F (36.5 °C)-98.3 °F (36.8 °C)] 98.3 °F (36.8 °C)  Heart Rate:  [] 81  Resp:  [16-18] 18  BP: (174-190)/(110-134) 174/110   SpO2:  [96 %-98 %] 96 %  on    Device (Oxygen Therapy): room air    Intake/Output Summary (Last 24 hours) at 4/22/2020 1545  Last data filed at 4/22/2020 1300  Gross per 24 hour   Intake 240 ml   Output --   Net 240 ml     Body mass index is 21.24 kg/m².      04/22/20  0640 04/22/20  1049   Weight: 63.5 kg (140 lb) 63.4 kg (139 lb 11.2 oz)     Weight change:     Physical Exam   Constitutional: He is oriented to person, place, and time. He appears well-developed. No distress.   thin   HENT:   Head: Normocephalic and atraumatic.   Eyes: Pupils are equal, round, and reactive to light. Conjunctivae are normal. Scleral icterus (mild and congested) is present.   Neck: Neck supple. No tracheal deviation present.   Cardiovascular: Normal rate, regular rhythm, normal heart sounds and intact distal pulses.   No murmur heard.  Pulmonary/Chest: Effort normal and breath sounds normal. He has no wheezes. He has no rales.   Abdominal: Soft. Bowel sounds are normal. There is tenderness (suprapubic and mild epigastric).   Musculoskeletal: He exhibits no edema.   Neurological: He is alert and oriented to person, place, and time.   Skin: Skin is warm and dry. He is not diaphoretic.   Psychiatric: He has a normal mood and affect. His behavior is normal.   Nursing note and vitals reviewed.       Lab Review:     Results from last 7 days   Lab Units 04/22/20  0648 04/20/20  1809   SODIUM mmol/L 136 140   POTASSIUM mmol/L 3.9 3.5   CHLORIDE mmol/L 93* 109*   TOTAL CO2 mmol/L  --  25   CO2 mmol/L 19.9*  --    BUN mg/dL 22 19   CREATININE mg/dL 2.00* 1.7*   GLUCOSE mg/dL 186*  --     CALCIUM mg/dL 9.3 8.8   AST (SGOT) U/L 31 21   ALT (SGPT) U/L 19 14     Estimated Creatinine Clearance: 34.3 mL/min (A) (by C-G formula based on SCr of 2 mg/dL (H)).  Results from last 7 days   Lab Units 04/22/20  0648   HEMOGLOBIN A1C % 5.80*     Results from last 7 days   Lab Units 04/22/20  0650   GLUCOSE mg/dL 180*     Results from last 7 days   Lab Units 04/20/20  1809   TROPONIN I ng/mL <0.012             Results from last 7 days   Lab Units 04/22/20  0648   MAGNESIUM mg/dL 3.0*         Results from last 7 days   Lab Units 04/22/20  0648 04/20/20  1809   WBC 10*3/mm3 18.38* 8.87   HEMOGLOBIN g/dL 16.5 13.5   HEMATOCRIT % 48.0 42.3   PLATELETS 10*3/mm3 176 180   MCV fL 86.5 90.2   MCH pg 29.7 28.8   MCHC g/dL 34.4 31.9   RDW % 15.1 16.0   RDW-SD fl 47.7  --    MPV fL 11.9 11.3*   NEUTROPHIL % % 84.5* 52.3   LYMPHOCYTE % % 6.0* 37.3   MONOCYTES % % 8.3 7.2   EOSINOPHIL % % 0.1* 2.8   BASOPHIL % % 0.2 0.2   IMM GRAN % % 0.9* 0.2*   NEUTROS ABS 10*3/mm3 15.54* 4.63   LYMPHS ABS 10*3/mm3 1.10 3.31   MONOS ABS 10*3/mm3 1.53* 0.64   EOS ABS 10*3/mm3 0.01 0.25   BASOS ABS 10*3/mm3 0.03 0.02   IMMATURE GRANS (ABS) 10*3/mm3 0.17* 0.02   NRBC /100 WBC 0.0 0           Results from last 7 days   Lab Units 04/22/20  0918 04/22/20  0648   PROCALCITONIN ng/mL  --  0.31*   LACTATE mmol/L 1.6  --          Results from last 7 days   Lab Units 04/20/20  1809   LIPASE U/L 207         Results from last 7 days   Lab Units 04/22/20  0821   NITRITE UA  Negative   WBC UA /HPF 3-5*   BACTERIA UA /HPF 2+*   SQUAM EPITHEL UA /HPF 0-2               Imaging:  Imaging Results (Last 24 Hours)     Procedure Component Value Units Date/Time    CT Head Without Contrast [[id number]] Collected:  04/22/20 0740     Updated:  04/22/20 0755    Narrative:       CT HEAD WITHOUT CONTRAST     HISTORY: New onset seizure, hypertension.     COMPARISON: None.     FINDINGS: The study is hampered by patient motion. There is no evidence  of intracranial  hemorrhage, hydrocephalus or of abnormal extra-axial  fluid. Areas of decreased attenuation involving the white matter of the  cerebral hemispheres are noted bilaterally suggesting moderate small  vessel ischemic disease. A lacunar infarct involving the head of the  caudate nucleus on the left is noted. No focal area of decreased  attenuation to suggest acute infarction is identified.       Impression:       1. The study is hampered by patient motion.   2. There is no evidence of acute infarction or hemorrhage.   3. Moderate small vessel ischemic disease and a lacunar infarct  involving the head of the caudate nucleus on the left is noted. Further  evaluation could be performed with a MRI examination of the brain with  and without contrast, particularly in a patient with a history of new  onset seizure activity.     The above information was called to and discussed with Pastora Joy.           Radiation dose reduction techniques were utilized, including automated  exposure control and exposure modulation based on body size.     This report was finalized on 4/22/2020 7:52 AM by Dr. Ernie Zavala M.D.       XR Chest 1 View [[id number]] Collected:  04/22/20 0710     Updated:  04/22/20 0716    Narrative:       XR CHEST 1 VW-  04/22/2020     HISTORY: New onset seizures.     The heart size is within normal limits. The chest is rotated minimally  to the left. No focal infiltrates are seen. No pneumothorax is seen.  Bony structures appear unremarkable.       Impression:       No acute process identified.     This report was finalized on 4/22/2020 7:12 AM by Dr. Ezequiel Vasquez M.D.             EKG:  ECG 12 Lead    (Results Pending)       I reviewed the patient's new clinical results and medications.  I reviewed the patient's new imaging results and agree with the interpretation.      Problem List:     Active Hospital Problems    Diagnosis  POA   • **New onset seizure (CMS/HCC) [R56.9]  Yes   • Hyperglycemia [R73.9]   Unknown   • Leukocytosis [D72.829]  Unknown   • Acute UTI (urinary tract infection) [N39.0]  Unknown   • Hypertension [I10]  Unknown   • Nausea & vomiting [R11.2]  Unknown   • Renal insufficiency [N28.9]  Unknown   • Renal mass [N28.89]  Unknown      Resolved Hospital Problems   No resolved problems to display.        Assessment and Plan:       New onset seizure (CMS/HCC)  -Prior history and no sign of incontinence or tongue biting, but did have a postictal phase.  -CT head showed moderate small vessel disease and lacunar infarct of left caudate nucleus.   -Appreciate neurology input with plans for MRI and EEG.  Given that this is the first episode of seizure plan to hold on antiepileptics unless other pathogenesis suggest treatment.  -Monitor nightly saturations to eval for any possible nocturnal hypoxia that could contribute  -Neurochecks and seizure precautions      Hyperglycemia  -Likely reactive from recent seizure activity.  -Check A1c  -Monitor glucose before meals at bedtime and treat with sliding scale insulin as needed      Leukocytosis  -Likely reactive from recent seizure activity in acute distress.  Also could be related to UTI.  This x-ray showed no sign of aspirational pneumonia. Afebrile and no hypoxia.   -Monitor CBC, check procal  -given recent ER visit raises concern for possible COVID exposure.       Acute UTI (urinary tract infection)  -abnormal UA suggestive of acute UTI.   -send urine for culture. Monitor blood cultures   -Continue rocephin daily       Hypertension  - BP elevated and recently out of home meds that could have caused some rebound hypertension.   - resume home meds, except HCTZ and aldactone (dt renal function)  -PRN hydralazine  -check trop, lipid panel and EKG given recent chest pain reports     Renal insuffiencey/ renal mass  -creatinine 2.0 up from 1.7 on 4/20 and unclear of baseline. Does have some proteinuria likely from hypertensive disease.   - monitor renal function and  if worsens consider renal consult  - recent Ct report from [facility] noted a renal mass that was likely benign and was small than prior study.  -check renal ultrasound    Abdominal pain/ N/V  -PRN antiemetics  -recent Ct unremarkable and could be related to UTI. Bentyl could have caused some constipation  - stool softeners   -KUB  -continue PPI    Tobacco use  -nicotine patch  -encouraged tobacco cessation    Admit to tele  DVT prophylaxis: SCD's  Code status addressed: full code  Diet: cardiac    I discussed the patients findings and my recommendations with patient.      ABILIO Nino  [facility]  04/22/20  2:09 PM    Dictated utilizing Dragon dictation

## 2020-04-23 ENCOUNTER — APPOINTMENT (OUTPATIENT)
Dept: CARDIOLOGY | Facility: HOSPITAL | Age: 65
End: 2020-04-23

## 2020-04-23 PROBLEM — E87.1 HYPONATREMIA: Status: ACTIVE | Noted: 2020-04-23

## 2020-04-23 LAB
ANION GAP SERPL CALCULATED.3IONS-SCNC: 14.7 MMOL/L (ref 5–15)
ANION GAP SERPL CALCULATED.3IONS-SCNC: 16.6 MMOL/L (ref 5–15)
AORTIC DIMENSIONLESS INDEX: 0.6 (DI)
BACTERIA BLD CULT: ABNORMAL
BASOPHILS # BLD AUTO: 0.02 10*3/MM3 (ref 0–0.2)
BASOPHILS NFR BLD AUTO: 0.1 % (ref 0–1.5)
BH CV ECHO MEAS - AO MAX PG (FULL): 7 MMHG
BH CV ECHO MEAS - AO MAX PG: 9.5 MMHG
BH CV ECHO MEAS - AO MEAN PG (FULL): 3 MMHG
BH CV ECHO MEAS - AO MEAN PG: 5 MMHG
BH CV ECHO MEAS - AO V2 MAX: 154 CM/SEC
BH CV ECHO MEAS - AO V2 MEAN: 102 CM/SEC
BH CV ECHO MEAS - AO V2 VTI: 25.8 CM
BH CV ECHO MEAS - AVA(I,A): 1.9 CM^2
BH CV ECHO MEAS - AVA(I,D): 1.9 CM^2
BH CV ECHO MEAS - AVA(V,A): 1.8 CM^2
BH CV ECHO MEAS - AVA(V,D): 1.8 CM^2
BH CV ECHO MEAS - BSA(HAYCOCK): 1.7 M^2
BH CV ECHO MEAS - BSA: 1.7 M^2
BH CV ECHO MEAS - BZI_BMI: 20.7 KILOGRAMS/M^2
BH CV ECHO MEAS - BZI_METRIC_HEIGHT: 172.7 CM
BH CV ECHO MEAS - BZI_METRIC_WEIGHT: 61.7 KG
BH CV ECHO MEAS - EDV(CUBED): 64 ML
BH CV ECHO MEAS - EDV(MOD-SP2): 57 ML
BH CV ECHO MEAS - EDV(MOD-SP4): 63 ML
BH CV ECHO MEAS - EDV(TEICH): 70 ML
BH CV ECHO MEAS - EF(CUBED): 69.2 %
BH CV ECHO MEAS - EF(MOD-BP): 58 %
BH CV ECHO MEAS - EF(MOD-SP2): 59.6 %
BH CV ECHO MEAS - EF(MOD-SP4): 55.6 %
BH CV ECHO MEAS - EF(TEICH): 61.4 %
BH CV ECHO MEAS - ESV(CUBED): 19.7 ML
BH CV ECHO MEAS - ESV(MOD-SP2): 23 ML
BH CV ECHO MEAS - ESV(MOD-SP4): 28 ML
BH CV ECHO MEAS - ESV(TEICH): 27 ML
BH CV ECHO MEAS - FS: 32.5 %
BH CV ECHO MEAS - IVS/LVPW: 1.1
BH CV ECHO MEAS - IVSD: 1.5 CM
BH CV ECHO MEAS - LAT PEAK E' VEL: 6.3 CM/SEC
BH CV ECHO MEAS - LV DIASTOLIC VOL/BSA (35-75): 36.3 ML/M^2
BH CV ECHO MEAS - LV MASS(C)D: 220.7 GRAMS
BH CV ECHO MEAS - LV MASS(C)DI: 127.2 GRAMS/M^2
BH CV ECHO MEAS - LV MAX PG: 2.5 MMHG
BH CV ECHO MEAS - LV MEAN PG: 2 MMHG
BH CV ECHO MEAS - LV SYSTOLIC VOL/BSA (12-30): 16.1 ML/M^2
BH CV ECHO MEAS - LV V1 MAX: 79.1 CM/SEC
BH CV ECHO MEAS - LV V1 MEAN: 57.6 CM/SEC
BH CV ECHO MEAS - LV V1 VTI: 14.2 CM
BH CV ECHO MEAS - LVIDD: 4 CM
BH CV ECHO MEAS - LVIDS: 2.7 CM
BH CV ECHO MEAS - LVLD AP2: 7.6 CM
BH CV ECHO MEAS - LVLD AP4: 6.9 CM
BH CV ECHO MEAS - LVLS AP2: 5.9 CM
BH CV ECHO MEAS - LVLS AP4: 5.7 CM
BH CV ECHO MEAS - LVOT AREA (M): 3.5 CM^2
BH CV ECHO MEAS - LVOT AREA: 3.5 CM^2
BH CV ECHO MEAS - LVOT DIAM: 2.1 CM
BH CV ECHO MEAS - LVPWD: 1.4 CM
BH CV ECHO MEAS - MED PEAK E' VEL: 4.7 CM/SEC
BH CV ECHO MEAS - MV A MAX VEL: 72.3 CM/SEC
BH CV ECHO MEAS - MV DEC SLOPE: 400 CM/SEC^2
BH CV ECHO MEAS - MV DEC TIME: 198 SEC
BH CV ECHO MEAS - MV E MAX VEL: 58.5 CM/SEC
BH CV ECHO MEAS - MV E/A: 0.81
BH CV ECHO MEAS - MV MAX PG: 3.1 MMHG
BH CV ECHO MEAS - MV MEAN PG: 2 MMHG
BH CV ECHO MEAS - MV P1/2T MAX VEL: 91.7 CM/SEC
BH CV ECHO MEAS - MV P1/2T: 67.1 MSEC
BH CV ECHO MEAS - MV V2 MAX: 87.5 CM/SEC
BH CV ECHO MEAS - MV V2 MEAN: 58.1 CM/SEC
BH CV ECHO MEAS - MV V2 VTI: 16.1 CM
BH CV ECHO MEAS - MVA P1/2T LCG: 2.4 CM^2
BH CV ECHO MEAS - MVA(P1/2T): 3.3 CM^2
BH CV ECHO MEAS - MVA(VTI): 3.1 CM^2
BH CV ECHO MEAS - PA ACC TIME: 0.09 SEC
BH CV ECHO MEAS - PA MAX PG (FULL): 1.5 MMHG
BH CV ECHO MEAS - PA MAX PG: 4.4 MMHG
BH CV ECHO MEAS - PA PR(ACCEL): 40.8 MMHG
BH CV ECHO MEAS - PA V2 MAX: 105 CM/SEC
BH CV ECHO MEAS - PI END-D VEL: 81.4 CM/SEC
BH CV ECHO MEAS - PULM A REVS DUR: 0.1 SEC
BH CV ECHO MEAS - PULM A REVS VEL: 35.1 CM/SEC
BH CV ECHO MEAS - PULM DIAS VEL: 48 CM/SEC
BH CV ECHO MEAS - PULM S/D: 1
BH CV ECHO MEAS - PULM SYS VEL: 49.9 CM/SEC
BH CV ECHO MEAS - RAP SYSTOLE: 3 MMHG
BH CV ECHO MEAS - RV MAX PG: 2.9 MMHG
BH CV ECHO MEAS - RV MEAN PG: 2 MMHG
BH CV ECHO MEAS - RV V1 MAX: 85.7 CM/SEC
BH CV ECHO MEAS - RV V1 MEAN: 56.4 CM/SEC
BH CV ECHO MEAS - RV V1 VTI: 13.5 CM
BH CV ECHO MEAS - RVSP: 11 MMHG
BH CV ECHO MEAS - SI(CUBED): 25.5 ML/M^2
BH CV ECHO MEAS - SI(LVOT): 28.3 ML/M^2
BH CV ECHO MEAS - SI(MOD-SP2): 19.6 ML/M^2
BH CV ECHO MEAS - SI(MOD-SP4): 20.2 ML/M^2
BH CV ECHO MEAS - SI(TEICH): 24.8 ML/M^2
BH CV ECHO MEAS - SV(CUBED): 44.3 ML
BH CV ECHO MEAS - SV(LVOT): 49.2 ML
BH CV ECHO MEAS - SV(MOD-SP2): 34 ML
BH CV ECHO MEAS - SV(MOD-SP4): 35 ML
BH CV ECHO MEAS - SV(TEICH): 43 ML
BH CV ECHO MEAS - TAPSE (>1.6): 2 CM2
BH CV ECHO MEAS - TR MAX PG: 8 MMHG
BH CV ECHO MEAS - TR MAX VEL: 144 CM/SEC
BH CV ECHO MEASUREMENTS AVERAGE E/E' RATIO: 10.64
BH CV XLRA - RV BASE: 3.13 CM
BH CV XLRA - TDI S': 15 CM/SEC
BUN BLD-MCNC: 36 MG/DL (ref 8–23)
BUN BLD-MCNC: 39 MG/DL (ref 8–23)
BUN/CREAT SERPL: 21.1 (ref 7–25)
BUN/CREAT SERPL: 24.2 (ref 7–25)
CALCIUM SPEC-SCNC: 8.5 MG/DL (ref 8.6–10.5)
CALCIUM SPEC-SCNC: 8.8 MG/DL (ref 8.6–10.5)
CHLORIDE SERPL-SCNC: 93 MMOL/L (ref 98–107)
CHLORIDE SERPL-SCNC: 93 MMOL/L (ref 98–107)
CHOLEST SERPL-MCNC: 200 MG/DL (ref 0–200)
CK SERPL-CCNC: 426 U/L (ref 20–200)
CO2 SERPL-SCNC: 21.4 MMOL/L (ref 22–29)
CO2 SERPL-SCNC: 23.3 MMOL/L (ref 22–29)
CREAT BLD-MCNC: 1.61 MG/DL (ref 0.76–1.27)
CREAT BLD-MCNC: 1.71 MG/DL (ref 0.76–1.27)
DEPRECATED RDW RBC AUTO: 44.5 FL (ref 37–54)
EOSINOPHIL # BLD AUTO: 0.02 10*3/MM3 (ref 0–0.4)
EOSINOPHIL NFR BLD AUTO: 0.1 % (ref 0.3–6.2)
ERYTHROCYTE [DISTWIDTH] IN BLOOD BY AUTOMATED COUNT: 14.5 % (ref 12.3–15.4)
GFR SERPL CREATININE-BSD FRML MDRD: 49 ML/MIN/1.73
GFR SERPL CREATININE-BSD FRML MDRD: 53 ML/MIN/1.73
GLUCOSE BLD-MCNC: 120 MG/DL (ref 65–99)
GLUCOSE BLD-MCNC: 126 MG/DL (ref 65–99)
GLUCOSE BLDC GLUCOMTR-MCNC: 106 MG/DL (ref 70–130)
GLUCOSE BLDC GLUCOMTR-MCNC: 128 MG/DL (ref 70–130)
GLUCOSE BLDC GLUCOMTR-MCNC: 132 MG/DL (ref 70–130)
HCT VFR BLD AUTO: 44.2 % (ref 37.5–51)
HDLC SERPL-MCNC: 90 MG/DL (ref 40–60)
HGB BLD-MCNC: 15.4 G/DL (ref 13–17.7)
IMM GRANULOCYTES # BLD AUTO: 0.06 10*3/MM3 (ref 0–0.05)
IMM GRANULOCYTES NFR BLD AUTO: 0.4 % (ref 0–0.5)
LDLC SERPL CALC-MCNC: 86 MG/DL (ref 0–100)
LDLC/HDLC SERPL: 0.96 {RATIO}
LEFT ATRIUM VOLUME INDEX: 15.7 ML/M2
LYMPHOCYTES # BLD AUTO: 1.39 10*3/MM3 (ref 0.7–3.1)
LYMPHOCYTES NFR BLD AUTO: 9.4 % (ref 19.6–45.3)
MAXIMAL PREDICTED HEART RATE: 158 BPM
MCH RBC QN AUTO: 29.4 PG (ref 26.6–33)
MCHC RBC AUTO-ENTMCNC: 34.8 G/DL (ref 31.5–35.7)
MCV RBC AUTO: 84.4 FL (ref 79–97)
MONOCYTES # BLD AUTO: 1.25 10*3/MM3 (ref 0.1–0.9)
MONOCYTES NFR BLD AUTO: 8.4 % (ref 5–12)
NEUTROPHILS # BLD AUTO: 12.1 10*3/MM3 (ref 1.7–7)
NEUTROPHILS NFR BLD AUTO: 81.6 % (ref 42.7–76)
NRBC BLD AUTO-RTO: 0 /100 WBC (ref 0–0.2)
PLATELET # BLD AUTO: 142 10*3/MM3 (ref 140–450)
PMV BLD AUTO: 11.7 FL (ref 6–12)
POTASSIUM BLD-SCNC: 3.9 MMOL/L (ref 3.5–5.2)
POTASSIUM BLD-SCNC: 4.1 MMOL/L (ref 3.5–5.2)
RBC # BLD AUTO: 5.24 10*6/MM3 (ref 4.14–5.8)
SODIUM BLD-SCNC: 131 MMOL/L (ref 136–145)
SODIUM BLD-SCNC: 131 MMOL/L (ref 136–145)
STRESS TARGET HR: 134 BPM
TRIGL SERPL-MCNC: 119 MG/DL (ref 0–150)
TROPONIN T SERPL-MCNC: 0.03 NG/ML (ref 0–0.03)
TSH SERPL DL<=0.05 MIU/L-ACNC: 1.15 UIU/ML (ref 0.27–4.2)
URATE SERPL-MCNC: 11 MG/DL (ref 3.4–7)
VLDLC SERPL-MCNC: 23.8 MG/DL (ref 5–40)
WBC NRBC COR # BLD: 14.84 10*3/MM3 (ref 3.4–10.8)

## 2020-04-23 PROCEDURE — 99231 SBSQ HOSP IP/OBS SF/LOW 25: CPT | Performed by: PSYCHIATRY & NEUROLOGY

## 2020-04-23 PROCEDURE — 80061 LIPID PANEL: CPT | Performed by: NURSE PRACTITIONER

## 2020-04-23 PROCEDURE — 84443 ASSAY THYROID STIM HORMONE: CPT | Performed by: NURSE PRACTITIONER

## 2020-04-23 PROCEDURE — 25010000002 CEFTRIAXONE PER 250 MG: Performed by: NURSE PRACTITIONER

## 2020-04-23 PROCEDURE — 93306 TTE W/DOPPLER COMPLETE: CPT

## 2020-04-23 PROCEDURE — 82962 GLUCOSE BLOOD TEST: CPT

## 2020-04-23 PROCEDURE — 80048 BASIC METABOLIC PNL TOTAL CA: CPT | Performed by: NURSE PRACTITIONER

## 2020-04-23 PROCEDURE — 93306 TTE W/DOPPLER COMPLETE: CPT | Performed by: INTERNAL MEDICINE

## 2020-04-23 PROCEDURE — 82550 ASSAY OF CK (CPK): CPT | Performed by: INTERNAL MEDICINE

## 2020-04-23 PROCEDURE — 25010000002 HYDRALAZINE PER 20 MG: Performed by: NURSE PRACTITIONER

## 2020-04-23 PROCEDURE — 84484 ASSAY OF TROPONIN QUANT: CPT | Performed by: NURSE PRACTITIONER

## 2020-04-23 PROCEDURE — 84550 ASSAY OF BLOOD/URIC ACID: CPT | Performed by: NURSE PRACTITIONER

## 2020-04-23 PROCEDURE — 85025 COMPLETE CBC W/AUTO DIFF WBC: CPT | Performed by: NURSE PRACTITIONER

## 2020-04-23 RX ORDER — ASPIRIN 81 MG/1
81 TABLET ORAL DAILY
Status: DISCONTINUED | OUTPATIENT
Start: 2020-04-23 | End: 2020-04-25 | Stop reason: HOSPADM

## 2020-04-23 RX ORDER — SPIRONOLACTONE 25 MG/1
25 TABLET ORAL DAILY
Status: DISCONTINUED | OUTPATIENT
Start: 2020-04-23 | End: 2020-04-23

## 2020-04-23 RX ORDER — METOPROLOL SUCCINATE 100 MG/1
200 TABLET, EXTENDED RELEASE ORAL
Status: DISCONTINUED | OUTPATIENT
Start: 2020-04-24 | End: 2020-04-25 | Stop reason: HOSPADM

## 2020-04-23 RX ORDER — HYDRALAZINE HYDROCHLORIDE 25 MG/1
25 TABLET, FILM COATED ORAL EVERY 8 HOURS SCHEDULED
Status: DISCONTINUED | OUTPATIENT
Start: 2020-04-23 | End: 2020-04-23

## 2020-04-23 RX ORDER — SODIUM CHLORIDE 9 MG/ML
75 INJECTION, SOLUTION INTRAVENOUS CONTINUOUS
Status: DISCONTINUED | OUTPATIENT
Start: 2020-04-23 | End: 2020-04-24

## 2020-04-23 RX ORDER — LOSARTAN POTASSIUM 100 MG/1
100 TABLET ORAL
Status: DISCONTINUED | OUTPATIENT
Start: 2020-04-23 | End: 2020-04-23

## 2020-04-23 RX ORDER — SIMETHICONE 80 MG
80 TABLET,CHEWABLE ORAL 4 TIMES DAILY PRN
Status: DISCONTINUED | OUTPATIENT
Start: 2020-04-23 | End: 2020-04-25 | Stop reason: HOSPADM

## 2020-04-23 RX ORDER — LACTULOSE 10 G/15ML
30 SOLUTION ORAL DAILY
Status: DISCONTINUED | OUTPATIENT
Start: 2020-04-23 | End: 2020-04-23

## 2020-04-23 RX ORDER — LACTULOSE 10 G/15ML
30 SOLUTION ORAL ONCE
Status: COMPLETED | OUTPATIENT
Start: 2020-04-23 | End: 2020-04-23

## 2020-04-23 RX ADMIN — LACTULOSE 30 G: 10 SOLUTION ORAL at 17:24

## 2020-04-23 RX ADMIN — HYDRALAZINE HYDROCHLORIDE 10 MG: 20 INJECTION INTRAMUSCULAR; INTRAVENOUS at 07:05

## 2020-04-23 RX ADMIN — SODIUM CHLORIDE 75 ML/HR: 9 INJECTION, SOLUTION INTRAVENOUS at 12:23

## 2020-04-23 RX ADMIN — SODIUM CHLORIDE, PRESERVATIVE FREE 10 ML: 5 INJECTION INTRAVENOUS at 20:29

## 2020-04-23 RX ADMIN — METOPROLOL TARTRATE 50 MG: 25 TABLET ORAL at 17:24

## 2020-04-23 RX ADMIN — NICOTINE 1 PATCH: 21 PATCH, EXTENDED RELEASE TRANSDERMAL at 09:47

## 2020-04-23 RX ADMIN — HYDRALAZINE HYDROCHLORIDE 75 MG: 50 TABLET, FILM COATED ORAL at 22:23

## 2020-04-23 RX ADMIN — AMLODIPINE BESYLATE 10 MG: 10 TABLET ORAL at 09:37

## 2020-04-23 RX ADMIN — SODIUM CHLORIDE 75 ML/HR: 9 INJECTION, SOLUTION INTRAVENOUS at 20:29

## 2020-04-23 RX ADMIN — PANTOPRAZOLE SODIUM 40 MG: 40 TABLET, DELAYED RELEASE ORAL at 06:35

## 2020-04-23 RX ADMIN — METOPROLOL SUCCINATE 100 MG: 100 TABLET, EXTENDED RELEASE ORAL at 09:37

## 2020-04-23 RX ADMIN — HYDRALAZINE HYDROCHLORIDE 25 MG: 25 TABLET, FILM COATED ORAL at 17:23

## 2020-04-23 RX ADMIN — ASPIRIN 81 MG: 81 TABLET, COATED ORAL at 17:27

## 2020-04-23 RX ADMIN — CEFTRIAXONE SODIUM 1 G: 1 INJECTION, SOLUTION INTRAVENOUS at 09:46

## 2020-04-23 RX ADMIN — SODIUM CHLORIDE, PRESERVATIVE FREE 10 ML: 5 INJECTION INTRAVENOUS at 09:38

## 2020-04-23 NOTE — PLAN OF CARE
Problem: Patient Care Overview  Goal: Plan of Care Review  Outcome: Ongoing (interventions implemented as appropriate)  Flowsheets (Taken 4/23/2020 0519)  Progress: no change  Plan of Care Reviewed With: patient  Note:   No changes over night and no new complaints. Patient has rested quietly. Frequent episodes of hiccups. Zofran given x1 for nausea. No c/o pain. A/o x4. VSS although can be hypertensive. Standby assist. Will continue to monitor.

## 2020-04-23 NOTE — PROGRESS NOTES
Progress Note    Name: Evin Park ADMIT: 2020   : 1955  PCP: System, Provider Not In    MRN: [id number] LOS: 1 days   AGE/SEX: 64 y.o. male  [facility]   Date of Encounter Visit: 20    Subjective:     Interval History: reviewed EEG, MRI, KUB, EKG and labs. No further seizure activity.     REVIEW OF SYSTEMS:   no fever, but reports hot and cold all night. Did not sleep well.   No headache, blurred vision, difficulty swallowing or weakness.   No chest pain, palpitations or edema.   No shortness of breath, cough or sputum.   No vomiting or diarrhea. No abdominal pain. Nausea with poor intake this am. No BM.   Did not sleep well.     Objective:   Temp:  [97 °F (36.1 °C)-99 °F (37.2 °C)] 98.7 °F (37.1 °C)  Heart Rate:  [74-96] 92  Resp:  [16-18] 18  BP: (149-191)/(108-123) 191/122   SpO2:  [96 %] 96 %  on    Device (Oxygen Therapy): room air    Intake/Output Summary (Last 24 hours) at 2020 1142  Last data filed at 2020 0900  Gross per 24 hour   Intake 600 ml   Output --   Net 600 ml     Body mass index is 21.03 kg/m².      20  0640 20  1049 20  0526   Weight: 63.5 kg (140 lb) 63.4 kg (139 lb 11.2 oz) 62.7 kg (138 lb 4.8 oz)     Weight change: -0.136 kg (-4.8 oz)    Physical Exam   Constitutional: He is oriented to person, place, and time. No distress.   thin   HENT:   Head: Atraumatic.   Nose: Nose normal.   Eyes: Conjunctivae are normal.   Congested sclera   Cardiovascular: Normal rate and regular rhythm.   No murmur heard.  Pulmonary/Chest: Effort normal. He has no wheezes. He has no rales.   Abdominal: Soft. Bowel sounds are normal. He exhibits no distension. There is no tenderness. There is no rebound.   Musculoskeletal: He exhibits no edema.   Neurological: He is alert and oriented to person, place, and time. No sensory deficit. Coordination normal.   Skin: Skin is warm and dry. He is not diaphoretic.       Results Review:      Results from last 7 days   Lab Units  04/23/20  0522 04/22/20  0648 04/20/20  1809   SODIUM mmol/L 131* 136 140   POTASSIUM mmol/L 4.1 3.9 3.5   CHLORIDE mmol/L 93* 93* 109*   TOTAL CO2 mmol/L  --   --  25   CO2 mmol/L 21.4* 19.9*  --    BUN mg/dL 36* 22 19   CREATININE mg/dL 1.71* 2.00* 1.7*   GLUCOSE mg/dL 126* 186*  --    CALCIUM mg/dL 8.5* 9.3 8.8   AST (SGOT) U/L  --  31 21   ALT (SGPT) U/L  --  19 14     Estimated Creatinine Clearance: 39.7 mL/min (A) (by C-G formula based on SCr of 1.71 mg/dL (H)).  Results from last 7 days   Lab Units 04/22/20  0648   HEMOGLOBIN A1C % 5.80*     Results from last 7 days   Lab Units 04/23/20  1113 04/23/20  0552 04/22/20  1709 04/22/20  0650   GLUCOSE mg/dL 106 128 122 180*     Results from last 7 days   Lab Units 04/22/20  0648 04/20/20  1809   TROPONIN I ng/mL  --  <0.012   TROPONIN T ng/mL 0.026  --          Results from last 7 days   Lab Units 04/23/20  0522   TSH uIU/mL 1.150     Results from last 7 days   Lab Units 04/22/20  0648   MAGNESIUM mg/dL 3.0*     Results from last 7 days   Lab Units 04/23/20  0522   CHOLESTEROL mg/dL 200   TRIGLYCERIDES mg/dL 119   HDL CHOL mg/dL 90*     Results from last 7 days   Lab Units 04/23/20  0522 04/22/20  0648  04/20/20  1809   WBC 10*3/mm3 14.84* 18.38*  --  8.87   HEMOGLOBIN g/dL 15.4 16.5  --  13.5   HEMATOCRIT % 44.2 48.0  --  42.3   PLATELETS 10*3/mm3 142 176  --  180   MCV fL 84.4 86.5   < > 90.2   MCH pg 29.4 29.7   < > 28.8   MCHC g/dL 34.8 34.4   < > 31.9   RDW % 14.5 15.1   < > 16.0   RDW-SD fl 44.5 47.7   < >  --    MPV fL 11.7 11.9   < > 11.3*   NEUTROPHIL % % 81.6* 84.5*   < > 52.3   LYMPHOCYTE % % 9.4* 6.0*   < > 37.3   MONOCYTES % % 8.4 8.3   < > 7.2   EOSINOPHIL % % 0.1* 0.1*   < > 2.8   BASOPHIL % % 0.1 0.2   < > 0.2   IMM GRAN % % 0.4 0.9*   < > 0.2*   NEUTROS ABS 10*3/mm3 12.10* 15.54*   < > 4.63   LYMPHS ABS 10*3/mm3 1.39 1.10   < > 3.31   MONOS ABS 10*3/mm3 1.25* 1.53*   < > 0.64   EOS ABS 10*3/mm3 0.02 0.01   < > 0.25   BASOS ABS 10*3/mm3 0.02 0.03    < > 0.02   IMMATURE GRANS (ABS) 10*3/mm3 0.06* 0.17*   < > 0.02   NRBC /100 WBC 0.0 0.0   < > 0    < > = values in this interval not displayed.             Results from last 7 days   Lab Units 04/22/20  0918 04/22/20  0648   PROCALCITONIN ng/mL  --  0.31*   LACTATE mmol/L 1.6  --          Results from last 7 days   Lab Units 04/20/20  1809   LIPASE U/L 207     Results from last 7 days   Lab Units 04/22/20  0939 04/22/20  0918   BLOODCX  No growth at 24 hours No growth at 24 hours         Results from last 7 days   Lab Units 04/22/20  0821   NITRITE UA  Negative   WBC UA /HPF 3-5*   BACTERIA UA /HPF 2+*   SQUAM EPITHEL UA /HPF 0-2     Results from last 7 days   Lab Units 04/23/20  0522   URIC ACID mg/dL 11.0*       Imaging:  Imaging Results (Last 24 Hours)     Procedure Component Value Units Date/Time    US Renal Bilateral [[id number]] Collected:  04/22/20 1745     Updated:  04/22/20 1753    Narrative:       US RENAL BILATERAL-     INDICATIONS: Left renal mass, acute kidney injury     TECHNIQUE: ULTRASOUND OF THE KIDNEYS AND URINARY BLADDER.     COMPARISON: None available     FINDINGS:     The right kidney measures 10.8 centimeters, the left kidney measures  10.0 centimeters.     Multiple right renal cysts are seen, as large as 1.0 cm. A rounded,  nearly isoechoic focus of the left lower kidney measures 1.9 x 1.8 x 1.5  cm, shows marginal echogenicity/calcification with mild shadowing, no  demonstrated internal vascularity, possibly corresponding to the stated  history of left renal mass, indeterminate on the basis of this exam,  possibility of neoplasm not excluded, further evaluation with enhanced  renal imaging is recommended if not contraindicated. No hydronephrosis  or echogenic nephrolithiasis.     Left ureteral jet was observed. No right ureteral jet was observed  during the exam. The urinary bladder otherwise appears unremarkable.       Impression:       The stated left renal mass remains indeterminate on the  basis of this  exam, neoplasm not excluded, further evaluation with enhanced renal  imaging is recommended if not contraindicated. Multiple right renal  cysts. No hydronephrosis.        This report was finalized on 4/22/2020 5:50 PM by Dr. Howard Dominguez M.D.       XR Abdomen KUB [[id number]] Collected:  04/22/20 1709     Updated:  04/22/20 1714    Narrative:       XR ABDOMEN KUB-     INDICATIONS: Abdominal pain, constipation     TECHNIQUE: Supine view of the abdomen     COMPARISON: None available     FINDINGS:      The bowel gas pattern is nonobstructive. A minimal amount of stool is  apparent in the colon. No supine evidence for free intraperitoneal gas.  No definite nephrolithiasis. If there is further clinical concern, CT  can be obtained for further examination.       Impression:          As described.     This report was finalized on 4/22/2020 5:10 PM by Dr. Howard Dominguez M.D.       MRI Brain Without Contrast [[id number]] Collected:  04/22/20 1630     Updated:  04/22/20 1641    Narrative:       MRI BRAIN WO CONTRAST-     INDICATIONS: Syncope, hypertension     TECHNIQUE: Multiplanar multisequence unenhanced magnetic resonance  imaging of the brain.     COMPARISON: Head CT from 04/22/2020     FINDINGS:     The diffusion-weighted images show no restricted diffusion to suggest  acute infarct. Old lacunar infarct is apparent at the left basal  ganglia.     The midline structures appear unremarkable.     The brain parenchyma shows moderate predominant periventricular white  matter T2 FLAIR signal hyperintensities suggesting chronic small vessel  ischemic change in a patient this age. Mild, somewhat more confluent  areas of T2 FLAIR signal hyperintensity at the posterior occipital  lobes, right more than left, for example image 16 of series 4,  potentially evidence of posterior reversible encephalopathy syndrome in  the appropriate clinical setting, correlate clinically.     Flow voids in the major arteries  at the base of the brain appear  unremarkable.     The paranasal sinuses, mastoid air cells, and orbits appear  unremarkable.       Impression:       No acute infarct. Moderate predominantly periventricular white matter  chronic small vessel ischemic change. Mild, somewhat more confluent  areas of T2 FLAIR signal hyperintensity at the posterior occipital  lobes, right more than left, potentially evidence of posterior  reversible encephalopathy syndrome in the appropriate clinical setting,  correlate clinically.        This report was finalized on 4/22/2020 4:38 PM by Dr. Howard Dominguez M.D.             I reviewed the patient's new clinical results and medications.         Medication Review:   Scheduled Meds:  amLODIPine 10 mg Oral Q24H   cefTRIAXone 1 g Intravenous Q24H   insulin lispro 0-7 Units Subcutaneous TID AC   losartan 100 mg Oral Q24H   metoprolol succinate  mg Oral Q24H   nicotine 1 patch Transdermal Q24H   pantoprazole 40 mg Oral QAM   sodium chloride 10 mL Intravenous Q12H   spironolactone 25 mg Oral Daily     Continuous Infusions:   PRN Meds:.•  acetaminophen  •  cloNIDine  •  dextrose  •  dextrose  •  docusate sodium  •  glucagon (human recombinant)  •  hydrALAZINE  •  ondansetron  •  simethicone  •  sodium chloride    Problem List:     Active Hospital Problems    Diagnosis  POA   • **New onset seizure (CMS/HCC) [R56.9]  Yes   • Hyponatremia [E87.1]  Unknown   • Hyperglycemia [R73.9]  Unknown   • Leukocytosis [D72.829]  Unknown   • Acute UTI (urinary tract infection) [N39.0]  Unknown   • Hypertension [I10]  Unknown   • Nausea & vomiting [R11.2]  Unknown   • Renal insufficiency [N28.9]  Unknown   • Renal mass [N28.89]  Unknown   • Tobacco use [Z72.0]  Unknown      Resolved Hospital Problems   No resolved problems to display.       Assessment and Plan:       New onset seizure (CMS/HCC)  -CT head showed moderate small vessel disease and lacunar infarct of left caudate nucleus.   -MRI showed old  left basal ganglia lacunar infarct, chronic small vessel disease and ?PRES  -EEG did not show seizure activity. Await neuro input  -no plan for antiepileptics at this time.   -Neurochecks and seizure precautions       Hyperglycemia  -Likely reactive from recent seizure activity.  - A1c 5.8  -Monitor glucose and sliding scale insulin as needed       Leukocytosis  -Likely reactive from recent seizure activity vs UTI.  WBC improved. NGTD on blood cultures.        Acute UTI (urinary tract infection)  -abnormal UA suggestive of acute UTI.   Urine culture was not sent due to low WBC.   -continue rocephin given reports of pelvic discomfort and burning urine.        Hypertension  - BP elevated and recently out of home meds that could have caused some rebound hypertension.   - continue amlodipine. Increase toprol  - consult nephrology to assist with BP managment  -PRN hydralazine  -recent chest pain a few days ago. Trop indeterminate. EKG with signs of LVH.   - repeat troponin and check echocardiogram. Denies any chest pain since arrival  -lipid panel ok. Add asa 81 daily     Renal insuffiencey/ renal mass  -creatinine 1.7 and unclear of baseline. Does have some proteinuria likely from hypertensive disease.   - recent CT report from [place] noted a renal mass. Renal ultrasound with multiple cysts.   -Hyponatremia noted today. Will check urine studies     Abdominal pain/ N/V  -PRN antiemetics and stool softeners  -recent CT unremarkable. KUB showed gas distention.   -continue PPI and add simethicone  -diet as tolerated     Tobacco use  -nicotine patch  -encouraged tobacco cessation    VTE prophylaxis: SCDs  CODE status: full code  Disposition: TBD    I discussed the patients findings and my recommendations with patient and Dr. Burton with nephrology.    Makenna Summers, APRN  04/23/20

## 2020-04-23 NOTE — PROGRESS NOTES
Patient Identification:  NAME:  Evin Park  Age:  64 y.o.   Sex:  male   :  1955   MRN:  [id number]       Chief complaint:PRES, accelerated hypertension    History of present illness: He has had an MRI scan that is consistent with posterior reversible encephalopathic syndrome which definitely makes sense now.  This would cause him to be confused to have a single seizure and it would all be secondary to his severe elevated blood pressure that now is under better control there is no acute stroke no acute hemorrhage and the EEG is without any seizure activity      Past medical history:  Past Medical History:   Diagnosis Date   • Hypertension    • Lumbar stenosis        Allergies:  Patient has no known allergies.    Home medications:  Medications Prior to Admission   Medication Sig Dispense Refill Last Dose   • dicyclomine (BENTYL) 20 MG tablet Take 20 mg by mouth.   Past Week at Unknown time   • Metoprolol Succinate 100 MG capsule extended-release 24 hour sprinkle Take 1 tablet by mouth Daily.   Past Week at Unknown time   • Olmesartan-amLODIPine-HCTZ 40-10-25 MG tablet Take 1 tablet by mouth Daily.   Past Week at Unknown time   • omeprazole (priLOSEC) 20 MG capsule Take 20 mg by mouth Daily.   Past Week at Unknown time   • ondansetron ODT (ZOFRAN-ODT) 4 MG disintegrating tablet Take 4 mg by mouth.   2020 at Unknown time   • promethazine (PHENERGAN) 25 MG suppository Insert 25 mg into the rectum.   Past Week at Unknown time   • spironolactone (ALDACTONE) 25 MG tablet Take 25 mg by mouth Daily.   Past Week at Unknown time        Hospital medications:    amLODIPine 10 mg Oral Q24H   aspirin 81 mg Oral Daily   cefTRIAXone 1 g Intravenous Q24H   hydrALAZINE 25 mg Oral Q8H   insulin lispro 0-7 Units Subcutaneous TID AC   lactulose 30 g Oral Once   [START ON 2020] metoprolol succinate  mg Oral Q24H   metoprolol tartrate 50 mg Oral Once   nicotine 1 patch Transdermal Q24H   pantoprazole 40 mg Oral  QAM   sodium chloride 10 mL Intravenous Q12H       sodium chloride 75 mL/hr Last Rate: 75 mL/hr (04/23/20 1223)     •  acetaminophen  •  cloNIDine  •  dextrose  •  dextrose  •  docusate sodium  •  glucagon (human recombinant)  •  hydrALAZINE  •  ondansetron  •  simethicone  •  sodium chloride      Objective:  Vitals Ranges:   Temp:  [97 °F (36.1 °C)-99.2 °F (37.3 °C)] 99.2 °F (37.3 °C)  Heart Rate:  [74-96] 95  Resp:  [16-18] 18  BP: (149-191)/(103-123) 154/103      Physical Exam:  Feels good today normal cranial nerves II through VII counts fingers normal  no pronator drift reflexes trace throughout symmetrical toes downgoing bilaterally    Results review:   I reviewed the patient's new clinical results.    Data review:  Lab Results (last 24 hours)     Procedure Component Value Units Date/Time    Troponin [[id number]]  (Normal) Collected:  04/23/20 0522    Specimen:  Blood Updated:  04/23/20 1318     Troponin T 0.026 ng/mL     Narrative:       Troponin T Reference Range:  <= 0.03 ng/mL-   Negative for AMI  >0.03 ng/mL-     Abnormal for myocardial necrosis.  Clinicians would have to utilize clinical acumen, EKG, Troponin and serial changes to determine if it is an Acute Myocardial Infarction or myocardial injury due to an underlying chronic condition.       Results may be falsely decreased if patient taking Biotin.      POC Glucose Once [[id number]]  (Normal) Collected:  04/23/20 1113    Specimen:  Blood Updated:  04/23/20 1122     Glucose 106 mg/dL     TSH [[id number]]  (Normal) Collected:  04/23/20 0522    Specimen:  Blood Updated:  04/23/20 1029     TSH 1.150 uIU/mL      Comment: TSH results may be falsely decreased if patient taking Biotin.       Uric Acid [[id number]]  (Abnormal) Collected:  04/23/20 0522    Specimen:  Blood Updated:  04/23/20 1022     Uric Acid 11.0 mg/dL     Blood Culture - Blood, Arm, Right [[id number]] Collected:  04/22/20 0918    Specimen:  Blood from Arm, Right Updated:  04/23/20 0945      Blood Culture No growth at 24 hours    Blood Culture - Blood, Arm, Left [[id number]] Collected:  04/22/20 0939    Specimen:  Blood from Arm, Left Updated:  04/23/20 0945     Blood Culture No growth at 24 hours    Basic Metabolic Panel [[id number]]  (Abnormal) Collected:  04/23/20 0522    Specimen:  Blood Updated:  04/23/20 0642     Glucose 126 mg/dL      BUN 36 mg/dL      Creatinine 1.71 mg/dL      Sodium 131 mmol/L      Potassium 4.1 mmol/L      Chloride 93 mmol/L      CO2 21.4 mmol/L      Calcium 8.5 mg/dL      eGFR   Amer 49 mL/min/1.73      BUN/Creatinine Ratio 21.1     Anion Gap 16.6 mmol/L     Narrative:       GFR Normal >60  Chronic Kidney Disease <60  Kidney Failure <15      Lipid Panel [[id number]]  (Abnormal) Collected:  04/23/20 0522    Specimen:  Blood Updated:  04/23/20 0623     Total Cholesterol 200 mg/dL      Triglycerides 119 mg/dL      HDL Cholesterol 90 mg/dL      LDL Cholesterol  86 mg/dL      VLDL Cholesterol 23.8 mg/dL      LDL/HDL Ratio 0.96    Narrative:       Cholesterol Reference Ranges  (U.S. Department of Health and Human Services ATP III Classifications)    Desirable          <200 mg/dL  Borderline High    200-239 mg/dL  High Risk          >240 mg/dL      Triglyceride Reference Ranges  (U.S. Department of Health and Human Services ATP III Classifications)    Normal           <150 mg/dL  Borderline High  150-199 mg/dL  High             200-499 mg/dL  Very High        >500 mg/dL    HDL Reference Ranges  (U.S. Department of Health and Human Services ATP III Classifcations)    Low     <40 mg/dl (major risk factor for CHD)  High    >60 mg/dl ('negative' risk factor for CHD)        LDL Reference Ranges  (U.S. Department of Health and Human Services ATP III Classifcations)    Optimal          <100 mg/dL  Near Optimal     100-129 mg/dL  Borderline High  130-159 mg/dL  High             160-189 mg/dL  Very High        >189 mg/dL    CBC Auto Differential [[id number]]  (Abnormal) Collected:   04/23/20 0522    Specimen:  Blood Updated:  04/23/20 0559     WBC 14.84 10*3/mm3      RBC 5.24 10*6/mm3      Hemoglobin 15.4 g/dL      Hematocrit 44.2 %      MCV 84.4 fL      MCH 29.4 pg      MCHC 34.8 g/dL      RDW 14.5 %      RDW-SD 44.5 fl      MPV 11.7 fL      Platelets 142 10*3/mm3      Neutrophil % 81.6 %      Lymphocyte % 9.4 %      Monocyte % 8.4 %      Eosinophil % 0.1 %      Basophil % 0.1 %      Immature Grans % 0.4 %      Neutrophils, Absolute 12.10 10*3/mm3      Lymphocytes, Absolute 1.39 10*3/mm3      Monocytes, Absolute 1.25 10*3/mm3      Eosinophils, Absolute 0.02 10*3/mm3      Basophils, Absolute 0.02 10*3/mm3      Immature Grans, Absolute 0.06 10*3/mm3      nRBC 0.0 /100 WBC     POC Glucose Once [[id number]]  (Normal) Collected:  04/23/20 0552    Specimen:  Blood Updated:  04/23/20 0554     Glucose 128 mg/dL     POC Glucose Once [[id number]]  (Normal) Collected:  04/22/20 1709    Specimen:  Blood Updated:  04/22/20 1712     Glucose 122 mg/dL     Troponin [[id number]]  (Normal) Collected:  04/22/20 0648    Specimen:  Blood Updated:  04/22/20 1623     Troponin T 0.026 ng/mL     Narrative:       Troponin T Reference Range:  <= 0.03 ng/mL-   Negative for AMI  >0.03 ng/mL-     Abnormal for myocardial necrosis.  Clinicians would have to utilize clinical acumen, EKG, Troponin and serial changes to determine if it is an Acute Myocardial Infarction or myocardial injury due to an underlying chronic condition.       Results may be falsely decreased if patient taking Biotin.             Imaging:  Imaging Results (Last 24 Hours)     Procedure Component Value Units Date/Time    US Renal Bilateral [[id number]] Collected:  04/22/20 1745     Updated:  04/22/20 1753    Narrative:       US RENAL BILATERAL-     INDICATIONS: Left renal mass, acute kidney injury     TECHNIQUE: ULTRASOUND OF THE KIDNEYS AND URINARY BLADDER.     COMPARISON: None available     FINDINGS:     The right kidney measures 10.8 centimeters, the left  kidney measures  10.0 centimeters.     Multiple right renal cysts are seen, as large as 1.0 cm. A rounded,  nearly isoechoic focus of the left lower kidney measures 1.9 x 1.8 x 1.5  cm, shows marginal echogenicity/calcification with mild shadowing, no  demonstrated internal vascularity, possibly corresponding to the stated  history of left renal mass, indeterminate on the basis of this exam,  possibility of neoplasm not excluded, further evaluation with enhanced  renal imaging is recommended if not contraindicated. No hydronephrosis  or echogenic nephrolithiasis.     Left ureteral jet was observed. No right ureteral jet was observed  during the exam. The urinary bladder otherwise appears unremarkable.       Impression:       The stated left renal mass remains indeterminate on the basis of this  exam, neoplasm not excluded, further evaluation with enhanced renal  imaging is recommended if not contraindicated. Multiple right renal  cysts. No hydronephrosis.        This report was finalized on 4/22/2020 5:50 PM by Dr. Howard Dominguez M.D.       XR Abdomen KUB [[id number]] Collected:  04/22/20 1709     Updated:  04/22/20 1714    Narrative:       XR ABDOMEN KUB-     INDICATIONS: Abdominal pain, constipation     TECHNIQUE: Supine view of the abdomen     COMPARISON: None available     FINDINGS:      The bowel gas pattern is nonobstructive. A minimal amount of stool is  apparent in the colon. No supine evidence for free intraperitoneal gas.  No definite nephrolithiasis. If there is further clinical concern, CT  can be obtained for further examination.       Impression:          As described.     This report was finalized on 4/22/2020 5:10 PM by Dr. Howard Dominguez M.D.       MRI Brain Without Contrast [[id number]] Collected:  04/22/20 1630     Updated:  04/22/20 1641    Narrative:       MRI BRAIN WO CONTRAST-     INDICATIONS: Syncope, hypertension     TECHNIQUE: Multiplanar multisequence unenhanced magnetic  resonance  imaging of the brain.     COMPARISON: Head CT from 04/22/2020     FINDINGS:     The diffusion-weighted images show no restricted diffusion to suggest  acute infarct. Old lacunar infarct is apparent at the left basal  ganglia.     The midline structures appear unremarkable.     The brain parenchyma shows moderate predominant periventricular white  matter T2 FLAIR signal hyperintensities suggesting chronic small vessel  ischemic change in a patient this age. Mild, somewhat more confluent  areas of T2 FLAIR signal hyperintensity at the posterior occipital  lobes, right more than left, for example image 16 of series 4,  potentially evidence of posterior reversible encephalopathy syndrome in  the appropriate clinical setting, correlate clinically.     Flow voids in the major arteries at the base of the brain appear  unremarkable.     The paranasal sinuses, mastoid air cells, and orbits appear  unremarkable.       Impression:       No acute infarct. Moderate predominantly periventricular white matter  chronic small vessel ischemic change. Mild, somewhat more confluent  areas of T2 FLAIR signal hyperintensity at the posterior occipital  lobes, right more than left, potentially evidence of posterior  reversible encephalopathy syndrome in the appropriate clinical setting,  correlate clinically.        This report was finalized on 4/22/2020 4:38 PM by Dr. Howard Dominguez M.D.                Assessment and Plan:     Patient has suffered a single seizure secondary to PRES, related to his severe elevated blood pressure I have reviewed the MRI with an independent eyeball review and it shows changes consistent with posterior reversible encephalopathic syndrome the EEG does not show any seizure activity  He is back to normal now blood pressures are better he does not need a long-term anticonvulsant medication the changes on the MRI scan consistent with press will continue to resolve he has a normal neurologic exam at  this time thanks      Wayne Baum MD  04/23/20  14:53

## 2020-04-23 NOTE — PLAN OF CARE
Pt BP still high.  Meds being adjusted by MD to treat high BP.  No c/o nausea. Nephro consulted.  Will continue to monitor

## 2020-04-23 NOTE — CONSULTS
Referring Provider: ABILIO Nino  Reason for Consultation: CECY vs CECY/CKD    Subjective     Chief complaint   Chief Complaint   Patient presents with   • Abdominal Pain   • Seizures       History of present illness:  65 yo AAM with unknown baseline SCR, admitted yesterday for further evaluation of new-onset seizure.  Renal consulted due to elevated SCR 2.0, with value today 1.7.  PMH outlined below; pertinent is hypertension; self-reported poor compliance with BP medications; chronic back pain; chronic constipation; and known left renal mass followed by urology (Dr. Matthias Escalante).  Neurology evaluating; lacunar infarct noted by CT scan  · Denies urinary complaints  · Chronic constipation; no bowel movement for several days  · Very poor appetite; has occasional nausea  · No shortness of breath, orthopnea, or leg swelling  · Had had chest pain several days ago, for which he was evaluated at [facility]; he has had no recurrence    Past Medical History:   Diagnosis Date   • Hypertension    • Lumbar stenosis      History reviewed. No pertinent surgical history.  Family History   Problem Relation Age of Onset   • Hypertension Mother    • Stroke Mother    • Diabetes Brother    • Stroke Maternal Grandmother      Social History     Tobacco Use   • Smoking status: Current Every Day Smoker     Packs/day: 1.00   Substance Use Topics   • Alcohol use: Yes     Comment: OCCASIONALLY 1-2 MIXED DRINKS   • Drug use: Yes     Types: Marijuana     Medications Prior to Admission   Medication Sig Dispense Refill Last Dose   • dicyclomine (BENTYL) 20 MG tablet Take 20 mg by mouth.   Past Week at Unknown time   • Metoprolol Succinate 100 MG capsule extended-release 24 hour sprinkle Take 1 tablet by mouth Daily.   Past Week at Unknown time   • Olmesartan-amLODIPine-HCTZ 40-10-25 MG tablet Take 1 tablet by mouth Daily.   Past Week at Unknown time   • omeprazole (priLOSEC) 20 MG capsule Take 20 mg by mouth Daily.   Past Week at  "Unknown time   • ondansetron ODT (ZOFRAN-ODT) 4 MG disintegrating tablet Take 4 mg by mouth.   4/21/2020 at Unknown time   • promethazine (PHENERGAN) 25 MG suppository Insert 25 mg into the rectum.   Past Week at Unknown time   • spironolactone (ALDACTONE) 25 MG tablet Take 25 mg by mouth Daily.   Past Week at Unknown time     Allergies:  Patient has no known allergies.    Review of Systems  14-point ROS performed and all negative except for pertinent +/-'s detailed in HPI.     Objective     Vital Signs  Temp:  [97 °F (36.1 °C)-99 °F (37.2 °C)] 98.7 °F (37.1 °C)  Heart Rate:  [74-96] 92  Resp:  [16-18] 18  BP: (149-191)/(108-123) 191/122    Flowsheet Rows      First Filed Value   Admission Height  172.7 cm (68\") Documented at 04/22/2020 0633   Admission Weight  63.5 kg (140 lb) Documented at 04/22/2020 0640           I/O this shift:  In: 120 [P.O.:120]  Out: -   I/O last 3 completed shifts:  In: 480 [P.O.:480]  Out: -     Intake/Output Summary (Last 24 hours) at 4/23/2020 1131  Last data filed at 4/23/2020 0900  Gross per 24 hour   Intake 600 ml   Output --   Net 600 ml       Physical Exam:  NAD; pleasant; oriented; looks stated age  Flat affect; normal mood  Dry MM; AT/NC   No eye discharge; no scleral icterus  No JVD; no carotid bruits  Course bilat; not labored  RR, tachycardic, no rub  Soft, +T but no G or R, +D, BS+  No edema  No clubbing  No asterixis  Moves all extremities   Speech is fluent    Results Review:  Results from last 7 days   Lab Units 04/23/20  0522 04/22/20  0648 04/20/20  1809   SODIUM mmol/L 131* 136 140   POTASSIUM mmol/L 4.1 3.9 3.5   CHLORIDE mmol/L 93* 93* 109*   TOTAL CO2 mmol/L  --   --  25   CO2 mmol/L 21.4* 19.9*  --    BUN mg/dL 36* 22 19   CREATININE mg/dL 1.71* 2.00* 1.7*   CALCIUM mg/dL 8.5* 9.3 8.8   BILIRUBIN mg/dL  --  1.1 0.3   ALK PHOS U/L  --  71 49   ALT (SGPT) U/L  --  19 14   AST (SGOT) U/L  --  31 21   GLUCOSE mg/dL 126* 186*  --        Estimated Creatinine Clearance: " 39.7 mL/min (A) (by C-G formula based on SCr of 1.71 mg/dL (H)).    Results from last 7 days   Lab Units 04/22/20  0648   MAGNESIUM mg/dL 3.0*       Results from last 7 days   Lab Units 04/23/20  0522 04/22/20  0648 04/20/20  1809   WBC 10*3/mm3 14.84* 18.38* 8.87   HEMOGLOBIN g/dL 15.4 16.5 13.5   PLATELETS 10*3/mm3 142 176 180             Active Medications    amLODIPine 10 mg Oral Q24H   cefTRIAXone 1 g Intravenous Q24H   insulin lispro 0-7 Units Subcutaneous TID AC   losartan 100 mg Oral Q24H   metoprolol succinate  mg Oral Q24H   nicotine 1 patch Transdermal Q24H   pantoprazole 40 mg Oral QAM   sodium chloride 10 mL Intravenous Q12H   spironolactone 25 mg Oral Daily          Assessment/Plan   Assessment  1.  CECY versus CECY/CKD: baseline SCR not clear.  Suspect prerenal component related to poor oral intake for several days with impaired renal autoregulation due to losartan; significant hypertension raises possibility of vasospasm.  Recent seizure and so risk of rhabdo; urinalysis with large blood and only a few RBCs.  Renal ultrasound without hydronephrosis; left renal mass noted (being followed outpatient by Dr. Matthias Escalante of urology).  Urinalysis notable for proteinuria, though in the setting of severe hypertension and recent seizure; lack of any significant pyuria and absence of symptoms argue against UTI.  Suspect volume contraction with lack of eating for several days; significant hyperuricemia also C/W volume contraction.  Hypovolemic hyponatremia  2.  New-onset seizure  3.  Hypertension: suspect an element of beta-blocker withdrawal.  He reports very poor compliance taking blood pressure medications in general  4.  Leukocytosis  5.  Constipation      New onset seizure (CMS/HCC)    Hyperglycemia    Leukocytosis    Acute UTI (urinary tract infection)    Hypertension    Nausea & vomiting    Renal insufficiency    Renal mass    Tobacco use    Hyponatremia      Plan  1.  Stop losartan and spironolactone  for now  2.  Increase metoprolol, and begin scheduled hydralazine  3.  Will give a dose of lactulose  4.  One liter of saline to expand intravascular volume  5.  Will recheck urine studies tomorrow to see if proteinuria improving with correction of hypertension  6.  Check CPK    I discussed the patient's findings and my recommendations with the patient and with ABILIO Nino MD  04/23/20  11:31

## 2020-04-23 NOTE — PROGRESS NOTES
Discharge Planning Assessment  [facility]     Patient Name: Evin Park  MRN: [id number]  Today's Date: 4/23/2020    Admit Date: 4/22/2020    Discharge Needs Assessment     Row Name 04/23/20 2884       Living Environment    Lives With  spouse;child(macario), adult    Name(s) of Who Lives With Patient  wife, Gilma Park, [phone]; daughter and daughter's spouse    Current Living Arrangements  home/apartment/condo    Primary Care Provided by  self    Provides Primary Care For  no one    Family Caregiver if Needed  child(macario), adult;spouse    Quality of Family Relationships  helpful;involved;supportive    Able to Return to Prior Arrangements  yes       Resource/Environmental Concerns    Resource/Environmental Concerns  none       Transition Planning    Patient/Family Anticipates Transition to  home with family    Patient/Family Anticipated Services at Transition  none    Transportation Anticipated  family or friend will provide       Discharge Needs Assessment    Concerns to be Addressed  no discharge needs identified;denies needs/concerns at this time    Equipment Currently Used at Home  cane, straight    Discharge Coordination/Progress  Home        Discharge Plan     Row Name 04/23/20 9870       Plan    Plan  Home with family    Patient/Family in Agreement with Plan  yes    Plan Comments  CCP met with pt to discuss d/c planning. Facesheet verified. CCP role explained. Pt resides in a three level home with his wife, daughter and daughter's spouse, and denies DME use aside from occasional use of a cane. Pt denies past home health and past sub-acute rehab, and confirms pharmacy is [facility]. Pt state he has [place] [employer] insurance and will attempt to request his family forward the information. Med Assist to eval for Medicaid eligibility. Pt denies known d/c needs at this time. CCP to follow to assist should d/c needs arise. Natacha Sutton LCSW        Destination      Coordination has not been  started for this encounter.      Durable Medical Equipment      Coordination has not been started for this encounter.      Dialysis/Infusion      Coordination has not been started for this encounter.      Home Medical Care      Coordination has not been started for this encounter.      Therapy      Coordination has not been started for this encounter.      Community Resources      Coordination has not been started for this encounter.          Demographic Summary     Row Name 04/23/20 1736       General Information    Admission Type  inpatient    Arrived From  home    Referral Source  admission list    Reason for Consult  discharge planning    Preferred Language  English        Functional Status     Row Name 04/23/20 1737       Functional Status    Usual Activity Tolerance  good    Current Activity Tolerance  good       Functional Status, IADL    Medications  independent    Meal Preparation  independent    Housekeeping  independent    Laundry  independent    Shopping  independent       Mental Status Summary    Recent Changes in Mental Status/Cognitive Functioning  no changes        Psychosocial    No documentation.       Abuse/Neglect    No documentation.       Legal    No documentation.       Substance Abuse    No documentation.       Patient Forms    No documentation.           Alanna Sutton LCSW

## 2020-04-24 LAB
ALBUMIN SERPL-MCNC: 3.1 G/DL (ref 3.5–5.2)
ANION GAP SERPL CALCULATED.3IONS-SCNC: 17.7 MMOL/L (ref 5–15)
BACTERIA SPEC AEROBE CULT: ABNORMAL
BUN BLD-MCNC: 39 MG/DL (ref 8–23)
BUN/CREAT SERPL: 28.5 (ref 7–25)
CALCIUM SPEC-SCNC: 8.3 MG/DL (ref 8.6–10.5)
CHLORIDE SERPL-SCNC: 98 MMOL/L (ref 98–107)
CO2 SERPL-SCNC: 19.3 MMOL/L (ref 22–29)
CREAT BLD-MCNC: 1.37 MG/DL (ref 0.76–1.27)
GFR SERPL CREATININE-BSD FRML MDRD: 64 ML/MIN/1.73
GLUCOSE BLD-MCNC: 102 MG/DL (ref 65–99)
GLUCOSE BLDC GLUCOMTR-MCNC: 100 MG/DL (ref 70–130)
GLUCOSE BLDC GLUCOMTR-MCNC: 102 MG/DL (ref 70–130)
GLUCOSE BLDC GLUCOMTR-MCNC: 108 MG/DL (ref 70–130)
GLUCOSE BLDC GLUCOMTR-MCNC: 99 MG/DL (ref 70–130)
GRAM STN SPEC: ABNORMAL
ISOLATED FROM: ABNORMAL
MAGNESIUM SERPL-MCNC: 2.6 MG/DL (ref 1.6–2.4)
PHOSPHATE SERPL-MCNC: 2.6 MG/DL (ref 2.5–4.5)
POTASSIUM BLD-SCNC: 3.8 MMOL/L (ref 3.5–5.2)
SODIUM BLD-SCNC: 135 MMOL/L (ref 136–145)

## 2020-04-24 PROCEDURE — 83735 ASSAY OF MAGNESIUM: CPT | Performed by: NURSE PRACTITIONER

## 2020-04-24 PROCEDURE — 80069 RENAL FUNCTION PANEL: CPT | Performed by: INTERNAL MEDICINE

## 2020-04-24 PROCEDURE — 25010000002 CEFTRIAXONE PER 250 MG: Performed by: NURSE PRACTITIONER

## 2020-04-24 PROCEDURE — 82962 GLUCOSE BLOOD TEST: CPT

## 2020-04-24 RX ORDER — MINOXIDIL 2.5 MG/1
2.5 TABLET ORAL EVERY 12 HOURS SCHEDULED
Status: DISCONTINUED | OUTPATIENT
Start: 2020-04-25 | End: 2020-04-25 | Stop reason: HOSPADM

## 2020-04-24 RX ORDER — BISACODYL 10 MG
10 SUPPOSITORY, RECTAL RECTAL DAILY PRN
Status: DISCONTINUED | OUTPATIENT
Start: 2020-04-24 | End: 2020-04-25 | Stop reason: HOSPADM

## 2020-04-24 RX ORDER — MINOXIDIL 2.5 MG/1
2.5 TABLET ORAL ONCE
Status: COMPLETED | OUTPATIENT
Start: 2020-04-24 | End: 2020-04-24

## 2020-04-24 RX ADMIN — PANTOPRAZOLE SODIUM 40 MG: 40 TABLET, DELAYED RELEASE ORAL at 06:43

## 2020-04-24 RX ADMIN — AMLODIPINE BESYLATE 10 MG: 10 TABLET ORAL at 09:04

## 2020-04-24 RX ADMIN — CEFTRIAXONE SODIUM 1 G: 1 INJECTION, SOLUTION INTRAVENOUS at 09:05

## 2020-04-24 RX ADMIN — MINOXIDIL 2.5 MG: 2.5 TABLET ORAL at 22:13

## 2020-04-24 RX ADMIN — METOPROLOL SUCCINATE 200 MG: 100 TABLET, EXTENDED RELEASE ORAL at 09:05

## 2020-04-24 RX ADMIN — ASPIRIN 81 MG: 81 TABLET, COATED ORAL at 09:05

## 2020-04-24 RX ADMIN — SODIUM CHLORIDE, PRESERVATIVE FREE 10 ML: 5 INJECTION INTRAVENOUS at 09:05

## 2020-04-24 RX ADMIN — HYDRALAZINE HYDROCHLORIDE 75 MG: 50 TABLET, FILM COATED ORAL at 06:43

## 2020-04-24 RX ADMIN — SODIUM CHLORIDE, PRESERVATIVE FREE 10 ML: 5 INJECTION INTRAVENOUS at 20:27

## 2020-04-24 RX ADMIN — NICOTINE 1 PATCH: 21 PATCH, EXTENDED RELEASE TRANSDERMAL at 09:06

## 2020-04-24 RX ADMIN — MINOXIDIL 2.5 MG: 2.5 TABLET ORAL at 15:53

## 2020-04-24 NOTE — PROGRESS NOTES
NEPHROLOGY PROGRESS NOTE    PATIENT IDENTIFICATION:   Name:  Evin Park      MRN:  [id number]     64 y.o.  male             Reason for visit: CECY vs CECY/CKD    SUBJECTIVE:   Complains of headache and hiccups; blood pressures remain high; breathing is comfortable on room air; appetite is mediocre, but no N/V    OBJECTIVE:  Vitals:    04/24/20 0600 04/24/20 0643 04/24/20 0804 04/24/20 1425   BP:  (!) 168/105 161/97 (!) 149/111   BP Location:   Left arm    Patient Position:   Lying Lying   Pulse:  80 95 95   Resp:       Temp:   98.2 °F (36.8 °C)    TempSrc:   Oral Oral   SpO2:   95% 99%   Weight: 62.6 kg (138 lb)      Height:               Body mass index is 20.98 kg/m².    Intake/Output Summary (Last 24 hours) at 4/24/2020 1450  Last data filed at 4/23/2020 1919  Gross per 24 hour   Intake 120 ml   Output 300 ml   Net -180 ml     Wt Readings from Last 1 Encounters:   04/24/20 0600 62.6 kg (138 lb)   04/23/20 1455 62.6 kg (138 lb)   04/23/20 0526 62.7 kg (138 lb 4.8 oz)   04/22/20 1049 63.4 kg (139 lb 11.2 oz)   04/22/20 0640 63.5 kg (140 lb)     Wt Readings from Last 3 Encounters:   04/24/20 62.6 kg (138 lb)         Exam:  NAD; pleasant; oriented; looks stated age  Flat affect; normal mood  Dry MM; AT/NC   No eye discharge; no scleral icterus  No JVD; no carotid bruits  Course bilat; not labored  RR, tachycardic, no rub  Soft, +T but no G or R, +D, BS+  No edema  No clubbing  No asterixis  Moves all extremities   Speech is fluent    Scheduled meds:    amLODIPine 10 mg Oral Q24H   aspirin 81 mg Oral Daily   hydrALAZINE 75 mg Oral Q8H   insulin lispro 0-7 Units Subcutaneous TID AC   metoprolol succinate  mg Oral Q24H   nicotine 1 patch Transdermal Q24H   pantoprazole 40 mg Oral QAM   sodium chloride 10 mL Intravenous Q12H     IV meds:                           Data Review:    Results from last 7 days   Lab Units 04/24/20  0542 04/23/20  1558 04/23/20  0522 04/22/20  0648  04/20/20  1809   SODIUM mmol/L 135*  131* 131* 136   < > 140   POTASSIUM mmol/L 3.8 3.9 4.1 3.9   < > 3.5   CHLORIDE mmol/L 98 93* 93* 93*   < > 109*   TOTAL CO2 mmol/L  --   --   --   --   --  25   CO2 mmol/L 19.3* 23.3 21.4* 19.9*   < >  --    BUN mg/dL 39* 39* 36* 22   < > 19   CREATININE mg/dL 1.37* 1.61* 1.71* 2.00*   < > 1.7*   CALCIUM mg/dL 8.3* 8.8 8.5* 9.3   < > 8.8   BILIRUBIN mg/dL  --   --   --  1.1  --  0.3   ALK PHOS U/L  --   --   --  71  --  49   ALT (SGPT) U/L  --   --   --  19  --  14   AST (SGOT) U/L  --   --   --  31  --  21   GLUCOSE mg/dL 102* 120* 126* 186*   < >  --     < > = values in this interval not displayed.     Estimated Creatinine Clearance: 49.5 mL/min (A) (by C-G formula based on SCr of 1.37 mg/dL (H)).  Results from last 7 days   Lab Units 04/23/20  0522   URIC ACID mg/dL 11.0*     Results from last 7 days   Lab Units 04/24/20  0542 04/22/20  0648   MAGNESIUM mg/dL 2.6* 3.0*   PHOSPHORUS mg/dL 2.6  --        Results from last 7 days   Lab Units 04/23/20  0522 04/22/20  0648 04/20/20  1809   WBC 10*3/mm3 14.84* 18.38* 8.87   HEMOGLOBIN g/dL 15.4 16.5 13.5   PLATELETS 10*3/mm3 142 176 180                   ASSESSMENT:     New onset seizure (CMS/HCC)    Hyperglycemia    Leukocytosis    Acute UTI (urinary tract infection)    Hypertension    Nausea & vomiting    Renal insufficiency    Renal mass    Tobacco use    Hyponatremia    1.  CECY versus CECY/CKD nonoliguric, improving: baseline SCR not clear.  Suspect prerenal component related to poor oral intake for several days with impaired renal autoregulation due to losartan; significant hypertension raises possibility of vasospasm.  Unimpressive CPK makes rhabdo unlikely.  Urinalysis with large blood and only a few RBCs.  Renal ultrasound without hydronephrosis; left renal mass noted (being followed outpatient by Dr. Matthias Escalante of urology).  Urinalysis notable for proteinuria, though in the setting of severe hypertension and recent seizure; lack of any significant pyuria and  absence of symptoms argue against UTI.  Suspect volume contraction with lack of eating for several days; significant hyperuricemia also C/W volume contraction.  Hypovolemic hyponatremia better after saline  2.  New-onset seizure: probably the result of PRES  3.  Hypertension:  not controlled  4.  Leukocytosis  5.  Constipation  6.  Staphylococcus bacteremia: real?   7.  Headache: this might be the result of hydralazine    PLAN:  1.  Stop hydralazine and begin minoxidil 2.5 mg twice daily  2.  Encouraged him to eat  3.  Re-check Uprot:cr once blood pressure controlled  4.  Surveillance labs    Elvin Burton MD  4/24/2020    14:50

## 2020-04-24 NOTE — PLAN OF CARE
Patients blood pressure elevated this shift, all other vitals stable. Patient denies pain and symptoms of high blood pressure. Patient gets up to bathroom ad naila. Patient asleep most of shift with no complaints. Will continue to monitor.

## 2020-04-24 NOTE — PROGRESS NOTES
Progress Note    Name: Evin Park ADMIT: 2020   : 1955  PCP: System, Provider Not In    MRN: [id number] LOS: 2 days   AGE/SEX: 64 y.o. male  ROOM: [facility]/1   Date of Encounter Visit: 20    Subjective:     Patient is lying in the bed and is in no major distress.  Denies nausea, vomiting, abdominal pain, chest pain.        Objective:   Temp:  [97.6 °F (36.4 °C)-98.2 °F (36.8 °C)] 98.2 °F (36.8 °C)  Heart Rate:  [80-95] 95  Resp:  [18-20] 18  BP: (149-183)/() 149/111   SpO2:  [95 %-99 %] 99 %  on    Device (Oxygen Therapy): room air    Intake/Output Summary (Last 24 hours) at 2020 1731  Last data filed at 2020 1919  Gross per 24 hour   Intake 120 ml   Output 300 ml   Net -180 ml     Body mass index is 20.98 kg/m².      20  0526 20  1455 20  0600   Weight: 62.7 kg (138 lb 4.8 oz) 62.6 kg (138 lb) 62.6 kg (138 lb)     Weight change: -0.771 kg (-1 lb 11.2 oz)    Physical Exam   Constitutional: He is oriented to person, place, and time. No distress.   thin   HENT:   Head: Normocephalic and atraumatic.   Nose: Nose normal.   Eyes: Pupils are equal, round, and reactive to light. Conjunctivae and EOM are normal.   Congested sclera   Neck: Normal range of motion. Neck supple. No JVD present.   Cardiovascular: Normal rate and regular rhythm.   No murmur heard.  Pulmonary/Chest: Effort normal and breath sounds normal. No respiratory distress. He has no wheezes. He has no rales.   Abdominal: Soft. Bowel sounds are normal. He exhibits no distension. There is no tenderness. There is no rebound.   Musculoskeletal: He exhibits no edema.   Neurological: He is alert and oriented to person, place, and time. No sensory deficit. Coordination normal.   Skin: Skin is warm and dry. He is not diaphoretic.       Results Review:      Results from last 7 days   Lab Units 20  0542 20  1558 20  0522 20  0648 20  1809   SODIUM mmol/L 135* 131* 131* 136 140      POTASSIUM mmol/L 3.8 3.9 4.1 3.9 3.5   CHLORIDE mmol/L 98 93* 93* 93* 109*   TOTAL CO2 mmol/L  --   --   --   --  25   CO2 mmol/L 19.3* 23.3 21.4* 19.9*  --    BUN mg/dL 39* 39* 36* 22 19   CREATININE mg/dL 1.37* 1.61* 1.71* 2.00* 1.7*   GLUCOSE mg/dL 102* 120* 126* 186*  --    CALCIUM mg/dL 8.3* 8.8 8.5* 9.3 8.8   AST (SGOT) U/L  --   --   --  31 21   ALT (SGPT) U/L  --   --   --  19 14     Estimated Creatinine Clearance: 49.5 mL/min (A) (by C-G formula based on SCr of 1.37 mg/dL (H)).  Results from last 7 days   Lab Units 04/22/20  0648   HEMOGLOBIN A1C % 5.80*     Results from last 7 days   Lab Units 04/24/20  1636 04/24/20  1150 04/24/20  0621 04/23/20  1613 04/23/20  1113 04/23/20  0552 04/22/20  1709 04/22/20  0650   GLUCOSE mg/dL 108 100 99 132* 106 128 122 180*     Results from last 7 days   Lab Units 04/23/20  1558 04/23/20  0522 04/22/20  0648 04/20/20  1809   CK TOTAL U/L 426*  --   --   --    TROPONIN I ng/mL  --   --   --  <0.012   TROPONIN T ng/mL  --  0.026 0.026  --          Results from last 7 days   Lab Units 04/23/20  0522   TSH uIU/mL 1.150     Results from last 7 days   Lab Units 04/24/20  0542 04/22/20  0648   MAGNESIUM mg/dL 2.6* 3.0*     Results from last 7 days   Lab Units 04/23/20  0522   CHOLESTEROL mg/dL 200   TRIGLYCERIDES mg/dL 119   HDL CHOL mg/dL 90*     Results from last 7 days   Lab Units 04/23/20  0522 04/22/20  0648  04/20/20  1809   WBC 10*3/mm3 14.84* 18.38*  --  8.87   HEMOGLOBIN g/dL 15.4 16.5  --  13.5   HEMATOCRIT % 44.2 48.0  --  42.3   PLATELETS 10*3/mm3 142 176  --  180   MCV fL 84.4 86.5   < > 90.2   MCH pg 29.4 29.7   < > 28.8   MCHC g/dL 34.8 34.4   < > 31.9   RDW % 14.5 15.1   < > 16.0   RDW-SD fl 44.5 47.7   < >  --    MPV fL 11.7 11.9   < > 11.3*   NEUTROPHIL % % 81.6* 84.5*   < > 52.3   LYMPHOCYTE % % 9.4* 6.0*   < > 37.3   MONOCYTES % % 8.4 8.3   < > 7.2   EOSINOPHIL % % 0.1* 0.1*   < > 2.8   BASOPHIL % % 0.1 0.2   < > 0.2   IMM GRAN % % 0.4 0.9*   < > 0.2*    NEUTROS ABS 10*3/mm3 12.10* 15.54*   < > 4.63   LYMPHS ABS 10*3/mm3 1.39 1.10   < > 3.31   MONOS ABS 10*3/mm3 1.25* 1.53*   < > 0.64   EOS ABS 10*3/mm3 0.02 0.01   < > 0.25   BASOS ABS 10*3/mm3 0.02 0.03   < > 0.02   IMMATURE GRANS (ABS) 10*3/mm3 0.06* 0.17*   < > 0.02   NRBC /100 WBC 0.0 0.0   < > 0    < > = values in this interval not displayed.             Results from last 7 days   Lab Units 04/22/20  0918 04/22/20  0648   PROCALCITONIN ng/mL  --  0.31*   LACTATE mmol/L 1.6  --          Results from last 7 days   Lab Units 04/20/20  1809   LIPASE U/L 207     Results from last 7 days   Lab Units 04/22/20  0939 04/22/20  0918   BLOODCX  Staphylococcus, coagulase negative* No growth at 2 days   BCIDPCR  Staphylococcus spp, not aureus. Identification by BCID PCR.*  --          Results from last 7 days   Lab Units 04/22/20  0821   NITRITE UA  Negative   WBC UA /HPF 3-5*   BACTERIA UA /HPF 2+*   SQUAM EPITHEL UA /HPF 0-2     Results from last 7 days   Lab Units 04/23/20  0522   URIC ACID mg/dL 11.0*       Imaging:  Imaging Results (Last 24 Hours)     ** No results found for the last 24 hours. **          I reviewed the patient's new clinical results and medications.         Medication Review:   Scheduled Meds:    amLODIPine 10 mg Oral Q24H   aspirin 81 mg Oral Daily   insulin lispro 0-7 Units Subcutaneous TID AC   metoprolol succinate  mg Oral Q24H   [START ON 4/25/2020] minoxidil 2.5 mg Oral Q12H   minoxidil 2.5 mg Oral Once   nicotine 1 patch Transdermal Q24H   pantoprazole 40 mg Oral QAM   sodium chloride 10 mL Intravenous Q12H     Continuous Infusions:   PRN Meds:.•  acetaminophen  •  bisacodyl  •  cloNIDine  •  dextrose  •  dextrose  •  docusate sodium  •  glucagon (human recombinant)  •  hydrALAZINE  •  ondansetron  •  simethicone  •  sodium chloride    Problem List:     Active Hospital Problems    Diagnosis  POA   • **New onset seizure (CMS/HCC) [R56.9]  Yes   • Hyponatremia [E87.1]  Unknown   •  Hyperglycemia [R73.9]  Unknown   • Leukocytosis [D72.829]  Unknown   • Acute UTI (urinary tract infection) [N39.0]  Unknown   • Hypertension [I10]  Unknown   • Nausea & vomiting [R11.2]  Unknown   • Renal insufficiency [N28.9]  Unknown   • Renal mass [N28.89]  Unknown   • Tobacco use [Z72.0]  Unknown      Resolved Hospital Problems   No resolved problems to display.       Assessment and Plan:       New onset seizure (CMS/HCC)  -Felt to be secondary to PRES syndrome.  MRI of the brain did not reveal any acute findings.  EEG with no evidence of seizure-like activity.  No need for antiepileptic medication.       Hyperglycemia  -Hemoglobin A1c is 5.8 and recommended to be on diabetic diet at home.       Leukocytosis  -Likely reactive, cultures did not reveal any growth.       Acute UTI (urinary tract infection)  -UA suggestive of UTI but cultures were not sent.  Complete a course.       Hypertension  -Blood pressure still moderately elevated and is on metoprolol, minoxidil, amlodipine.  Nephrology is following.     Renal insuffiencey/ renal mass  -creatinine 1.3 and unclear of baseline. Does have some proteinuria likely from hypertensive disease.   - recent CT report from [place] noted a renal mass. Renal ultrasound with multiple cysts.     Abdominal pain/ N/V  -Has resolved, imaging studies with no significant findings.  Continue with acid suppressive therapy and simethicone as needed.     Tobacco use  -nicotine patch  -encouraged tobacco cessation    VTE prophylaxis: SCDs  CODE status: full code  Disposition: TBD               Improved

## 2020-04-25 VITALS
BODY MASS INDEX: 20.92 KG/M2 | HEART RATE: 91 BPM | OXYGEN SATURATION: 96 % | RESPIRATION RATE: 18 BRPM | WEIGHT: 138 LBS | SYSTOLIC BLOOD PRESSURE: 116 MMHG | HEIGHT: 68 IN | TEMPERATURE: 98 F | DIASTOLIC BLOOD PRESSURE: 75 MMHG

## 2020-04-25 LAB
ALBUMIN SERPL-MCNC: 3 G/DL (ref 3.5–5.2)
ANION GAP SERPL CALCULATED.3IONS-SCNC: 16.3 MMOL/L (ref 5–15)
BUN BLD-MCNC: 38 MG/DL (ref 8–23)
BUN/CREAT SERPL: 22.9 (ref 7–25)
CALCIUM SPEC-SCNC: 8.1 MG/DL (ref 8.6–10.5)
CHLORIDE SERPL-SCNC: 98 MMOL/L (ref 98–107)
CO2 SERPL-SCNC: 20.7 MMOL/L (ref 22–29)
CREAT BLD-MCNC: 1.66 MG/DL (ref 0.76–1.27)
GFR SERPL CREATININE-BSD FRML MDRD: 51 ML/MIN/1.73
GLUCOSE BLD-MCNC: 108 MG/DL (ref 65–99)
GLUCOSE BLDC GLUCOMTR-MCNC: 149 MG/DL (ref 70–130)
PHOSPHATE SERPL-MCNC: 2.9 MG/DL (ref 2.5–4.5)
POTASSIUM BLD-SCNC: 3.8 MMOL/L (ref 3.5–5.2)
SODIUM BLD-SCNC: 135 MMOL/L (ref 136–145)

## 2020-04-25 PROCEDURE — 82962 GLUCOSE BLOOD TEST: CPT

## 2020-04-25 PROCEDURE — 80069 RENAL FUNCTION PANEL: CPT | Performed by: INTERNAL MEDICINE

## 2020-04-25 RX ORDER — ASPIRIN 81 MG/1
81 TABLET ORAL DAILY
Qty: 30 TABLET | Refills: 0 | Status: SHIPPED | OUTPATIENT
Start: 2020-04-26 | End: 2020-05-26

## 2020-04-25 RX ORDER — AMLODIPINE BESYLATE 10 MG/1
10 TABLET ORAL
Qty: 30 TABLET | Refills: 0 | Status: SHIPPED | OUTPATIENT
Start: 2020-04-26 | End: 2020-05-26

## 2020-04-25 RX ORDER — MINOXIDIL 2.5 MG/1
2.5 TABLET ORAL EVERY 12 HOURS SCHEDULED
Qty: 60 TABLET | Refills: 0 | Status: SHIPPED | OUTPATIENT
Start: 2020-04-25 | End: 2020-05-25

## 2020-04-25 RX ORDER — METOPROLOL SUCCINATE 200 MG/1
200 TABLET, EXTENDED RELEASE ORAL
Qty: 30 TABLET | Refills: 0 | Status: SHIPPED | OUTPATIENT
Start: 2020-04-26 | End: 2020-05-26

## 2020-04-25 RX ADMIN — PANTOPRAZOLE SODIUM 40 MG: 40 TABLET, DELAYED RELEASE ORAL at 06:12

## 2020-04-25 RX ADMIN — SODIUM CHLORIDE, PRESERVATIVE FREE 10 ML: 5 INJECTION INTRAVENOUS at 08:23

## 2020-04-25 RX ADMIN — MINOXIDIL 2.5 MG: 2.5 TABLET ORAL at 08:23

## 2020-04-25 RX ADMIN — ASPIRIN 81 MG: 81 TABLET, COATED ORAL at 08:23

## 2020-04-25 RX ADMIN — METOPROLOL SUCCINATE 200 MG: 100 TABLET, EXTENDED RELEASE ORAL at 08:23

## 2020-04-25 RX ADMIN — AMLODIPINE BESYLATE 10 MG: 10 TABLET ORAL at 08:23

## 2020-04-25 RX ADMIN — NICOTINE 1 PATCH: 21 PATCH, EXTENDED RELEASE TRANSDERMAL at 08:22

## 2020-04-25 NOTE — PLAN OF CARE
Patients vitals stable this shift, blood pressure is down. Patient denies pain and discomfort. Patient asleep most of shift. Will continue to monitor.

## 2020-04-25 NOTE — PROGRESS NOTES
NEPHROLOGY PROGRESS NOTE    PATIENT IDENTIFICATION:   Name:  Evin Park      MRN:  [id number]     64 y.o.  male             Reason for visit: CECY vs CECY/CKD    SUBJECTIVE:   The patient is complaining of persistent hiccups, blood pressure improved with the changes made yesterday.  Denies any chest pain or shortness of air, no orthopnea or PND, no nausea or vomiting, no abdominal pain, no dysuria or gross hematuria.    OBJECTIVE:  Vitals:    04/24/20 1425 04/24/20 1915 04/24/20 2318 04/25/20 0713   BP: (!) 149/111 143/83 132/81 134/78   BP Location:  Left arm Left arm Right arm   Patient Position: Lying Lying Lying Lying   Pulse: 95 99 97 99   Resp:  18 18 18   Temp:  98.7 °F (37.1 °C) 97.9 °F (36.6 °C) 98.3 °F (36.8 °C)   TempSrc: Oral Oral Oral Oral   SpO2: 99%  96% 96%   Weight:       Height:               Body mass index is 20.98 kg/m².    Intake/Output Summary (Last 24 hours) at 4/25/2020 1107  Last data filed at 4/25/2020 0905  Gross per 24 hour   Intake 240 ml   Output --   Net 240 ml     Wt Readings from Last 1 Encounters:   04/24/20 0600 62.6 kg (138 lb)   04/23/20 1455 62.6 kg (138 lb)   04/23/20 0526 62.7 kg (138 lb 4.8 oz)   04/22/20 1049 63.4 kg (139 lb 11.2 oz)   04/22/20 0640 63.5 kg (140 lb)     Wt Readings from Last 3 Encounters:   04/24/20 62.6 kg (138 lb)         Exam:    General Appearance: alert, oriented x 3, no acute distress, appears to be chronically ill  Skin: warm and dry  HEENT: pupils round and reactive to light, oral mucosa normal, poor dentition, nonicteric sclera  Neck: supple, no JVD, trachea midline, no carotid bruit  Lungs: CTA, unlabored breathing effort  Heart: RRR, normal S1 and S2, no S3, no rub  Abdomen: soft, non-tender,  present bowel sounds to auscultation  : no palpable bladder,  Extremities: no edema, cyanosis or clubbing  Neuro: normal speech and mental status      Scheduled meds:      amLODIPine 10 mg Oral Q24H   aspirin 81 mg Oral Daily   insulin lispro 0-7  Units Subcutaneous TID AC   metoprolol succinate  mg Oral Q24H   minoxidil 2.5 mg Oral Q12H   nicotine 1 patch Transdermal Q24H   pantoprazole 40 mg Oral QAM   sodium chloride 10 mL Intravenous Q12H     IV meds:                           Data Review:    Results from last 7 days   Lab Units 04/25/20  0621 04/24/20  0542 04/23/20  1558  04/22/20  0648 04/20/20  1809   SODIUM mmol/L 135* 135* 131*   < > 136 140   POTASSIUM mmol/L 3.8 3.8 3.9   < > 3.9 3.5   CHLORIDE mmol/L 98 98 93*   < > 93* 109*   TOTAL CO2 mmol/L  --   --   --   --   --  25   CO2 mmol/L 20.7* 19.3* 23.3   < > 19.9*  --    BUN mg/dL 38* 39* 39*   < > 22 19   CREATININE mg/dL 1.66* 1.37* 1.61*   < > 2.00* 1.7*   CALCIUM mg/dL 8.1* 8.3* 8.8   < > 9.3 8.8   BILIRUBIN mg/dL  --   --   --   --  1.1 0.3   ALK PHOS U/L  --   --   --   --  71 49   ALT (SGPT) U/L  --   --   --   --  19 14   AST (SGOT) U/L  --   --   --   --  31 21   GLUCOSE mg/dL 108* 102* 120*   < > 186*  --     < > = values in this interval not displayed.     Estimated Creatinine Clearance: 40.9 mL/min (A) (by C-G formula based on SCr of 1.66 mg/dL (H)).  Results from last 7 days   Lab Units 04/23/20  0522   URIC ACID mg/dL 11.0*     Results from last 7 days   Lab Units 04/25/20  0621 04/24/20  0542 04/22/20  0648   MAGNESIUM mg/dL  --  2.6* 3.0*   PHOSPHORUS mg/dL 2.9 2.6  --        Results from last 7 days   Lab Units 04/23/20  0522 04/22/20  0648 04/20/20  1809   WBC 10*3/mm3 14.84* 18.38* 8.87   HEMOGLOBIN g/dL 15.4 16.5 13.5   PLATELETS 10*3/mm3 142 176 180                   ASSESSMENT:   1.  Acute versus acute on chronic kidney disease, creatinine today 1.63, no significant change over 48 hours.  Maybe slight rise in the creatinine from yesterday because of better blood pressure control.  His kidney disease probably chronic and related to uncontrolled hypertension  2.  Renal mass been followed by urology  3.  New onset seizure probably the result of PRES  4.  Questionable  Staphylococcus bacteremia  5.  Headache improved  6.  Hyponatremia resolved  7.  Hypertension improved  8.  Leukocytosis, WBCs on 4/23/2020 was 14.84    PLAN:  1.  Continue the same treatment  2.  Surveillance labs      Marquise Ordaz MD  4/25/2020    11:07

## 2020-04-25 NOTE — DISCHARGE SUMMARY
NAME: Evin Park ADMIT: 2020   : 1955  PCP: System, Provider Not In    MRN: [id number] LOS: 3 days   AGE/SEX: 64 y.o. male  [facility]     Date of Admission:  2020  Date of Discharge:  2020    PCP: System, Provider Not In    CHIEF COMPLAINT  Abdominal Pain and Seizures      DISCHARGE DIAGNOSIS  Active Hospital Problems    Diagnosis  POA   • **New onset seizure (CMS/HCC) [R56.9]  Yes   • Hyponatremia [E87.1]  Unknown   • Hyperglycemia [R73.9]  Unknown   • Leukocytosis [D72.829]  Unknown   • Acute UTI (urinary tract infection) [N39.0]  Unknown   • Hypertension [I10]  Unknown   • Nausea & vomiting [R11.2]  Unknown   • Renal insufficiency [N28.9]  Unknown   • Renal mass [N28.89]  Unknown   • Tobacco use [Z72.0]  Unknown      Resolved Hospital Problems   No resolved problems to display.       SECONDARY DIAGNOSES  Past Medical History:   Diagnosis Date   • Hypertension    • Lumbar stenosis        CONSULTS       HOSPITAL COURSE    Evin Park is a 64 y.o. male with a history of hypertension and lumbar disease who presented with complaints of seizure like activity.  Patient was just seen in the ER at [facility] 2 days ago due to complaints of chest pain, abdominal pain and nausea/ vomiting.  He had a CT of abdomen at that time that showed a left renal mass that was felt to possibly be a benign entity and was discharged home.  At home he was still complaining of abdominal pain, nausea and vomiting and per wife report he took some mag citrate and MiraLAX because he felt like he was constipated and still has not had a BM for a few days.  Wife went to check on him this morning and was noted that he was thrashing in bed for about 2 to 3 minutes and was drooling and difficult to respond.  He had no incontinence of urine and stool and did not seem to bite his tongue.  Denies any chest pain, fever, chills, cough, headache, blurred vision, shortness of breath or recent exposure to sick contact or  someone with COVID-19. Denies any prior stroke, CAD, DM, HLD or cancer.       He does drink alcohol occasionally, but not on a regular basis and none within the past 4 days.  He reported that he was out of his blood pressure medications for a couple of days and was awaiting refill from his PCP. He is a 1PPD smoker and also long term marijuana user.  In the ER he was hypertensive, but had no fever or hypoxia.  Labs showed leukocytosis, hyperglycemia, slightly elevated creatinine of 2.0 (baseline of 1.7 on 420), mag 3.0, normal lactate and abnormal UA with greater than 300 protein, RBC, WBC and 2+ bacteria with trace ketones and negative nitrate.  Urine drug screen was positive for THC and alcohol level was negative.  This x-ray showed no acute disease.    New onset seizure (CMS/HCC)  -Felt to be secondary to PRES syndrome.  MRI of the brain did not reveal any acute findings.  EEG with no evidence of seizure-like activity.  No need for antiepileptic medication.  Neurology did evaluate during this hospital stay.       Hyperglycemia  -Hemoglobin A1c is 5.8 and recommended to be on diabetic diet at home.       Leukocytosis  -Likely reactive, cultures did not reveal any growth.       Acute UTI (urinary tract infection)  -Was treated with antibiotics during this hospital stay but cultures did not reveal any growth.  Therefore no further antibiotics.       Hypertension  -Blood pressure still moderately elevated and is on metoprolol, minoxidil, amlodipine.  Nephrology is following.  I have advised patient to keep a log of blood pressure at home so the necessary adjustments could be made as an outpatient basis.     Renal insuffiencey/ renal mass  -creatinine 1.6 and unclear of baseline. Does have some proteinuria likely from hypertensive disease.   - recent CT report from [place] noted a renal mass. Renal ultrasound with multiple cysts.  Continue follow-up with nephrology as an outpatient basis.     Abdominal pain/ N/V  -Has  resolved, imaging studies with no significant findings.  Continue with acid suppressive therapy and simethicone as needed.  His abdominal symptoms are now completely resolved and is tolerating p.o. diet without any difficulty.     Tobacco use  -encouraged tobacco cessation    Please note patient was seen and examined today on day of discharge.  Time taken to discharge 45 minutes.       CONDITION ON DISCHARGE  Stable.      DISCHARGE DISPOSITION   Home or Self Care      DISCHARGE MEDICATIONS       Your medication list      START taking these medications      Instructions Last Dose Given Next Dose Due   amLODIPine 10 MG tablet  Commonly known as:  NORVASC  Start taking on:  April 26, 2020      Take 1 tablet by mouth Daily for 30 days.       aspirin 81 MG EC tablet  Start taking on:  April 26, 2020      Take 1 tablet by mouth Daily for 30 days.       metoprolol succinate  MG 24 hr tablet  Commonly known as:  TOPROL-XL  Start taking on:  April 26, 2020  Replaces:  Metoprolol Succinate 100 MG capsule extended-release 24 hour sprinkle      Take 1 tablet by mouth Daily for 30 days.       minoxidil 2.5 MG tablet  Commonly known as:  LONITEN      Take 1 tablet by mouth Every 12 (Twelve) Hours for 30 days.          CONTINUE taking these medications      Instructions Last Dose Given Next Dose Due   dicyclomine 20 MG tablet  Commonly known as:  BENTYL      Take 20 mg by mouth.       omeprazole 20 MG capsule  Commonly known as:  priLOSEC      Take 20 mg by mouth Daily.       ondansetron ODT 4 MG disintegrating tablet  Commonly known as:  ZOFRAN-ODT      Take 4 mg by mouth.          STOP taking these medications    Metoprolol Succinate 100 MG capsule extended-release 24 hour sprinkle  Replaced by:  metoprolol succinate  MG 24 hr tablet        Olmesartan-amLODIPine-HCTZ 40-10-25 MG tablet        promethazine 25 MG suppository  Commonly known as:  PHENERGAN        spironolactone 25 MG tablet  Commonly known as:   ALDACTONE              Where to Get Your Medications      These medications were sent to [facility] [address] [place] [address] - [address] - [phone]  - [phone] FX  [address] [facility] [address]    Phone:  [phone]   · amLODIPine 10 MG tablet  · aspirin 81 MG EC tablet  · metoprolol succinate  MG 24 hr tablet  · minoxidil 2.5 MG tablet       Diet Instructions     Diet: Renal      Discharge Diet:  Renal       No future appointments.  Additional Instructions for the Follow-ups that You Need to Schedule     Discharge Follow-up with PCP   As directed       Currently Documented PCP:    System, Provider Not In    PCP Phone Number:    None     Follow Up Details:  1 week         Discharge Follow-up with Specified Provider: ; 2 Weeks   As directed      To:      Follow Up:  2 Weeks           Follow-up Information     System, Provider Not In .    Why:  1 week  Contact information:  [facility] [id number]                   TEST  RESULTS PENDING AT DISCHARGE   Order Current Status    Blood Culture - Blood, Arm, Right Preliminary result             Ben Trejo MD  [facility]  04/25/20  14:35

## 2020-04-26 ENCOUNTER — READMISSION MANAGEMENT (OUTPATIENT)
Dept: CALL CENTER | Facility: HOSPITAL | Age: 65
End: 2020-04-26

## 2020-04-26 NOTE — OUTREACH NOTE
Prep Survey      Responses   [facility] facility patient discharged from?  [place]   Is LACE score < 7 ?  No   Eligibility  Readm Mgmt   Discharge diagnosis  New onset seizure    COVID-19 Test Status  Not tested   Does the patient have one of the following disease processes/diagnoses(primary or secondary)?  Other   Does the patient have Home health ordered?  No   Is there a DME ordered?  No   Prep survey completed?  Yes          Malathi Kim RN

## 2020-04-27 LAB — BACTERIA SPEC AEROBE CULT: NORMAL

## 2020-04-27 NOTE — PROGRESS NOTES
Case Management Discharge Note      Final Note: Home no additional dc orders noted for CCP. Troy bowers/ccp         Destination      No service has been selected for the patient.      Durable Medical Equipment      No service has been selected for the patient.      Dialysis/Infusion      No service has been selected for the patient.      Home Medical Care      No service has been selected for the patient.      Therapy      No service has been selected for the patient.      Community Resources      No service has been selected for the patient.        Transportation Services  Private: Car    Final Discharge Disposition Code: 01 - home or self-care

## 2020-04-29 ENCOUNTER — READMISSION MANAGEMENT (OUTPATIENT)
Dept: CALL CENTER | Facility: HOSPITAL | Age: 65
End: 2020-04-29

## 2020-04-29 NOTE — OUTREACH NOTE
Medical Week 1 Survey      Responses   [facility] patient discharged from?  [place]   COVID-19 Test Status  Not tested   Does the patient have one of the following disease processes/diagnoses(primary or secondary)?  Other   Is there a successful TCM telephone encounter documented?  No   Week 1 attempt successful?  Yes   Call start time  0919   Call end time  0922   Discharge diagnosis  New onset seizure    Person spoke with today (if not patient) and relationship  Gilma-spouse   Meds reviewed with patient/caregiver?  Yes   Is the patient having any side effects they believe may be caused by any medication additions or changes?  No   Does the patient have all medications ordered at discharge?  Yes   Is the patient taking all medications as directed (includes completed medication regime)?  Yes   Does the patient have a primary care provider?   Yes   Does the patient have an appointment with their PCP within 7 days of discharge?  Yes   Comments regarding PCP  Dr. Ramirez, PCP, appt 4/28/20   Has the patient kept scheduled appointments due by today?  Yes   Psychosocial issues?  No   Did the patient receive a copy of their discharge instructions?  Yes   Nursing interventions  Reviewed instructions with patient   What is the patient's perception of their health status since discharge?  Improving [Pt has the hiccup]   Is the patient/caregiver able to teach back signs and symptoms related to disease process for when to call PCP?  Yes   Is the patient/caregiver able to teach back signs and symptoms related to disease process for when to call 911?  Yes   Is the patient/caregiver able to teach back the hierarchy of who to call/visit for symptoms/problems? PCP, Specialist, Home health nurse, Urgent Care, ED, 911  Yes   Week 1 call completed?  Yes          Reina Burroughs RN

## 2020-05-04 ENCOUNTER — READMISSION MANAGEMENT (OUTPATIENT)
Dept: CALL CENTER | Facility: HOSPITAL | Age: 65
End: 2020-05-04

## 2020-05-04 NOTE — OUTREACH NOTE
Medical Week 2 Survey      Responses   [facility] patient discharged from?  [place]   COVID-19 Test Status  Not tested   Does the patient have one of the following disease processes/diagnoses(primary or secondary)?  Other   Week 2 attempt successful?  No   Unsuccessful attempts  Attempt 1          Lien Weber RN

## 2020-05-07 ENCOUNTER — READMISSION MANAGEMENT (OUTPATIENT)
Dept: CALL CENTER | Facility: HOSPITAL | Age: 65
End: 2020-05-07

## 2020-05-07 NOTE — OUTREACH NOTE
Medical Week 2 Survey      Responses   [facility] patient discharged from?  [place]   COVID-19 Test Status  Not tested   Does the patient have one of the following disease processes/diagnoses(primary or secondary)?  Other   Week 2 attempt successful?  Yes   Call start time  1407   Call end time  1409   Meds reviewed with patient/caregiver?  Yes   Is the patient taking all medications as directed (includes completed medication regime)?  Yes   Has the patient kept scheduled appointments due by today?  Yes   Pulse Ox monitoring  None   What is the patient's perception of their health status since discharge?  Improving   Week 2 Call Completed?  Yes   Graduated  Yes   Did the patient feel the follow up calls were helpful during their recovery period?  Yes   Was the number of calls appropriate?  Yes   Graduated/Revoked comments  Doing well.  No problems or questions.  He has had follow up and is scheduled for a stress test.          Lien Weber RN